# Patient Record
Sex: FEMALE | Race: WHITE | NOT HISPANIC OR LATINO
[De-identification: names, ages, dates, MRNs, and addresses within clinical notes are randomized per-mention and may not be internally consistent; named-entity substitution may affect disease eponyms.]

---

## 2021-12-01 ENCOUNTER — FORM ENCOUNTER (OUTPATIENT)
Age: 51
End: 2021-12-01

## 2021-12-16 ENCOUNTER — APPOINTMENT (OUTPATIENT)
Dept: ORTHOPEDIC SURGERY | Facility: CLINIC | Age: 51
End: 2021-12-16
Payer: COMMERCIAL

## 2021-12-16 ENCOUNTER — NON-APPOINTMENT (OUTPATIENT)
Age: 51
End: 2021-12-16

## 2021-12-16 VITALS
HEIGHT: 66 IN | SYSTOLIC BLOOD PRESSURE: 124 MMHG | WEIGHT: 215 LBS | HEART RATE: 80 BPM | BODY MASS INDEX: 34.55 KG/M2 | OXYGEN SATURATION: 90 % | DIASTOLIC BLOOD PRESSURE: 86 MMHG

## 2021-12-16 DIAGNOSIS — Z78.9 OTHER SPECIFIED HEALTH STATUS: ICD-10-CM

## 2021-12-16 DIAGNOSIS — M17.10 UNILATERAL PRIMARY OSTEOARTHRITIS, UNSPECIFIED KNEE: ICD-10-CM

## 2021-12-16 PROCEDURE — 99214 OFFICE O/P EST MOD 30 MIN: CPT

## 2021-12-17 NOTE — DISCUSSION/SUMMARY
[All Questions Answered] : Patient (and family) had all questions answered to an agreeable level of satisfaction [Interested in Proceeding] : Patient (and family) expressed understanding and interest in proceeding with the plan as outlined [de-identified] : It seems like this is an active chondroid lesion.  I am trying to get old imaging.  Based on the old MRI scan it does not appear that it is different however she says she has had x-rays for the past 13 years since her original injury.  I would like to be able to see this to be able to see the extent to which this has developed her ingrown.  In any event I do not think she needs to wait on her knee replacement she can have this at any point.  If this is an active cartilage lesion it would require either resection or curettaging.  She did have minimal change and I would just watch it.  She is going to get all of her old x-ray imaging for me to follow-up.\par \par If imaging was ordered, the patient was told to make an appointment to review findings right after all imaging is completed.\par \par We discussed risks, benefits and alternatives. Rationale of care was reviewed and all questions were answered. Patient (and family) had all questions answered to her degree of the level of satisfaction. Patient (and family) expressed understanding and interest in proceeding with the plan as outlined.\par \par \par \par \par This note was done with a voice recognition transcription software and any typos are related to this rather than medical error. Surgical risks reviewed. Patient (and family) had all questions answered to an agreeable level of satisfaction. Patient (and family) expressed understanding and interest in proceeding with the plan as outlined.  \par

## 2021-12-17 NOTE — CONSULT LETTER
[Dear  ___] : Dear  [unfilled], [FreeTextEntry2] : Mike Mendoza MD\par [address] [facility], \par [address]\[place] [address] [FreeTextEntry1] : \par After evaluating your patient and reviewing the studies presented we have come to a mutually agreeable plan. \par \par Please see my note below.\par \par Should you have further questions, please feel free to call and discuss the care of your patient.\par \par Thank you for the confidence of your referral.\par \par Sincerely, \par \par David Zhang MD \par Chief, Musculoskeletal Oncology \par [address] [facility], [address], \[place] [address] \par [id number]\par [phone]

## 2021-12-17 NOTE — DATA REVIEWED
[Imaging Present] : Present [de-identified] : CT scan done on December 2, 2021 Shows a 4 to 5 cm lesion in the proximal fibula with internal mineralization consistent with a chondroid lesion.  This has expanded the cortex and has made a thin.  There is no cortical breakthrough or anything outside of the bone.  There are also significant degenerative arthrosis especially in the patellofemoral joint.\par \par MRI scan from December 3, 2020 similarly shows the same lesion.  Between the two modalities it is roughly the same size.  I do not have any x-rays.

## 2021-12-17 NOTE — PHYSICAL EXAM
[General Appearance - Well-Appearing] : Well appearing [General Appearance - Well Nourished] : well nourished [Sclera] : the sclera and conjunctiva were normal [Neck Cervical Mass (___cm)] : no neck mass was observed [Heart Rate And Rhythm] : heart rate was normal and rhythm regular [] : No respiratory distress [Shuffling] : shuffling [Antalgic Gait Right] : antalgic on the right [Antalgic Gait Left] : antalgic on the left [Cane] : Uses cane for ambulation [Tenderness] : tenderness [Swelling] : swelling [Incision Healed - Describe:] : Incision is healed ~M [Full ROM Unless otherwise noted:] : Full range of motion unless otherwise noted: [LE  Motor Strength Normal unless otherwise noted:] : 5/5 strength in bilateral lower extemities unless otherwise noted. [Normal] : Sensation intact to light touch. [2+] : left 2+ [FreeTextEntry1] : Anxious

## 2021-12-17 NOTE — REVIEW OF SYSTEMS
[Joint Pain] : joint pain [Joint Stiffness] : joint stiffness [Joint Swelling] : joint swelling [Anxiety] : anxiety [Feeling Tired] : not feeling tired

## 2021-12-17 NOTE — ADDENDUM
[FreeTextEntry1] : I spoke to Dr. Mendoza on the phone about this so he is aware that arthroplasty can proceed.

## 2021-12-23 DIAGNOSIS — M89.9 DISORDER OF BONE, UNSPECIFIED: ICD-10-CM

## 2022-04-06 ENCOUNTER — APPOINTMENT (OUTPATIENT)
Dept: ORTHOPEDIC SURGERY | Facility: CLINIC | Age: 52
End: 2022-04-06
Payer: OTHER MISCELLANEOUS

## 2022-04-06 VITALS — HEIGHT: 66 IN | BODY MASS INDEX: 34.55 KG/M2 | WEIGHT: 215 LBS

## 2022-04-06 PROCEDURE — 99214 OFFICE O/P EST MOD 30 MIN: CPT

## 2022-04-06 PROCEDURE — 99072 ADDL SUPL MATRL&STAF TM PHE: CPT

## 2022-04-06 NOTE — HISTORY OF PRESENT ILLNESS
[10] : 10 [de-identified] : WC DOI:10/31/2008\par Reports immediate neck pain and pain that radiated down the RUE\par Cspine 4v- straight, DDD most notable C5-7, facet arthropathy\par \par MRI C spine 1/6/22 [facility]: 1. Multilevel degenerative disc disease and spondylosis, mild to moderate in severity at C5-6 and C6-7.\par 2. Mild upper thoracic levoscoliosis with minimal dextroconvex cervical spinal curvature. Straightening of cervical lordosis with grade 1 anterolisthesis of C7 on T1.\par 3. Left eccentric disc bulge at C3-4 resulting in moderate left lateral recess and foraminal stenosis.\par 4. Left eccentric disc bulge at C4-5 with small broad-based right central herniation resulting in mild left foraminal stenosis.\par 5. Bulging disc and broad-based central herniation at C5-6 mildly impinging upon the ventral spinal cord and resulting in mild central canal and moderate to marked bilateral foraminal stenosis.\par 6. Bulging disc at C6-7 resulting in moderate right and moderate to marked left foraminal stenosis.\par 7. Pseudodisc bulge at C7-T1 with bilateral facet joint hypertrophy resulting in moderate to marked left and moderate right foraminal stenosis.\par 8. Broad-based central disc herniations partially visualized at T2-3 and T3-4 with minimal impingement upon the ventral spinal cord. \par Ind. review- C5/6 bulge with HNP and b/l foraminal narrowing; \par C6/7 bulge with b/l foraminal narrowing\par \par Ms. Tami Mg, a 51-year-old female, presents today for neck pain. She fell off a 12 foot ladder 10/31/2008. Pain is right in the back of the neck. Radiates down RUE with numbness into all fingers. Symptoms are worse with activity, wakes up at night with numbness. She is OOW, disabled. She was working as a  on the floor at [employer] at the time of injury. \par 1/10/22- MRI f/u. Still neck pain with pain radiating down the BUE through scapulae, arms, forearms; a/w headaches as well\par 2/23/22- continued neck pain radiating down BUE through scapulae\par 4/6/22-Having severe lower back pain;states her back went out when putting her pants on. PT hs been helping with neck pain. Had an episode of R hand tremors with associated numbness in entire R hand and R anterior forearm a few days ago.

## 2022-04-06 NOTE — PHYSICAL EXAM
[FreeTextEntry8] : Palpation of the cervical spine is as follows: right trapezial tenderness, right rhomboid tenderness and bilateral paracervical tenderness. Range of motion of the cervical spine is as follows: full range of motion with mild stiffness Neurological testing for the cervical spine is as follows: positive left Spurling test, positive Hernández reflex on left and positive Hernández reflex on right.  [FreeTextEntry9] : The injury is a sprain/strain. The injury is a disc herniation. The incident described by the patient was the\par competent medical cause of the injury. The patient's complaints are consistent with the injury. The patient's\par history of the injury is consistent with my objective findings. The percentage of temporary impairment is\par total. Prognosis for recovery is guarded The patient's PMHx reveals pre-existing condition(s) that may affect\par treatment/prognosis. Cervical DDD. The patient cannot return to work because. pain, mobility limitations. No\par Rx restrictions. Board authorized health care provider. I provided the services listed above. \par FORMAL REQUEST AUTHORIZATION FOR Spine Physical Therapy.

## 2022-04-06 NOTE — ASSESSMENT
[FreeTextEntry1] : Given the time that has elapsed between injury and this evaluation, difficult to assess causal relationship between\par injury and current symptoms\par Given that she never had neck pain with pain radiating down the RUE prior to the fall, my opinion is that the injury is\par causally related to her complaints\par PT\par C5/6, C6/7 bulging, HNP with foraminal narrowing discussed ACDF\par

## 2022-04-11 ENCOUNTER — APPOINTMENT (OUTPATIENT)
Dept: ORTHOPEDIC SURGERY | Facility: CLINIC | Age: 52
End: 2022-04-11
Payer: OTHER MISCELLANEOUS

## 2022-04-11 VITALS — HEIGHT: 66 IN | BODY MASS INDEX: 34.55 KG/M2 | WEIGHT: 215 LBS

## 2022-04-11 PROCEDURE — 99213 OFFICE O/P EST LOW 20 MIN: CPT

## 2022-04-11 PROCEDURE — 99072 ADDL SUPL MATRL&STAF TM PHE: CPT

## 2022-04-11 NOTE — HISTORY OF PRESENT ILLNESS
[Gradual] : gradual [9] : 9 [Dull/Aching] : dull/aching [Throbbing] : throbbing [Constant] : constant [Sitting] : sitting [Standing] : standing [Walking] : walking [Exercising] : exercising [Stairs] : stairs [Disabled] : Work status: disabled [de-identified] : 4/11/22: Continued and worsening pain in b/l knees. Has not been able to attend PT due to authorization issues. Prev inj provided mild relief. \par \par Previous doc:\par ( DOI 10/31/2008)\par Pt seeing  Pabon for years for her génesis knee injury at work - she is on disability now- she had a Rt TKA in 2016 - she is having pain with this has cont swelling sn has difficulty bending her knee - pain standing and kneeling- always has swelling in the knee- her left knee has pain with OA has had inj and cont to have pain - new rt knee clicking as well - was never sig improved after surgery -- stairs are very difficulty =she feels overall the rt knee is worse right\par 4/16/19: F/U MRI left knee. Had blood work done. Both knees still hurt.\par 6/10/19: No change - cont pain in both knees. No auth yet for HA inj left knee or brace right knee.\par 11/24/20: Progressively worsening pain of both knees. Previously prescribed mobic did not give her relief. \par 12/18/20: F/U MRI both knees. Cortisone inj left knee helped for 3 days.\par 1/26/21: Orthovisc #1 left knee.\par 2/2/21: Orthovisc #2 left knee.\par 2/9/21: Orthovisc #3 left knee.\par 2/22/21: orthovisc #4 left knee.\par 11/23/21: f/up for bilateral knees. she was seen by Dr. Ryan on 10/11/21 as she was having increasing pain. She was given a CSI for the L knee which was beneficial for 2 days. She is complaining of increasing pain and loss of ROM in the left knee. She is also having medial right knee pain. She has not yet been approved for PT.\par 12/13/21: F/u review CT results.\par 1/21/22: continued and worsening pain in the left knee. She was recently told she needs spinal surgery and would like to continue with PT for now. [] : no [FreeTextEntry1] : B/L KNEE [FreeTextEntry5] : B/L KNEE PAIN IS GETTING WORSE. \par LEFT KNEE IS CRACKING OFTEN [de-identified] : NONE

## 2022-04-11 NOTE — PHYSICAL EXAM
[Left] : left knee [Right] : right knee [FreeTextEntry3] : pain with ROM med and lateral pain - ROM 3- 100 - gaurding

## 2022-04-11 NOTE — ASSESSMENT
[FreeTextEntry1] : Rt Total knee- painful ? mild mid flex instability otherwise looks good - we will get blood work and try a brace to see if that helps - her left knee only has mild OA so I do not see benefit for TKA at this point - for now we will focus on rt as that is the more painful knee\par 4/16/19: MRI showing lateral and PF OA, no meniscus tear seen. Blood work neg. She is not a candidate for arthroscopy and not enough damage yet for left TKA. Long time discussion regarding outcomes of revision for right knee but for now will just try orthovisc left knee.\par 6/10/19: Auth pending for HA inj left knee and HKB right knee. For now will try PT and Mobic.\par 11/24/20: Continued pain the right knee at the proximal aspect of patella and quad insertion, feels that she has small defect there. Will get MRI of the right knee to look at quad tendon and patella component. Left knee has moderate OA that has progressively gotten worse. Will try injection today. Will also repeat MRI of the left knee to look for progression of her previous injury and to evaluate fibular head lesion to confirm no change.\par 12/18/20: Short term relief from left knee inj - MRI with sig PF OA, recc HA injections. 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 Start PT for both knees.\par 11/23/21: R TKA hardware in place; she continues to have R knee pain. Increased left knee pain and loss of ROM. Discussed pain medication vs. L TKA. She has done CSI and HA injections. Most recent CSI 10/11/21 provided minimal relief. Advised her pain may be idiopathic. Patient would like to proceed with L TKA. 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 Planning to proceed with L TKA. Will send to oncologist to confirm nothing needs to be done for fibular head at time of TKA. waiting for Auth for TKA\par 1/21/22: Evaluation of the lesion is begin as per the oncologist. She is planning for TKA after her family issues resolve. She also is being treated for her cspine. Treated with CSI today to alleviate symptoms in the mean time\par \par 4/11/22: overall right TKA still has significant pain and complex postop course due to lack of PT. Will continue to monitor this.  She has significant OA left knee and is preparing for TKA once she feels she is able to and will continue PT on b/l knees. - Discussed not a candidate for Rev TKA at this time with unknow cause of pain

## 2022-06-01 ENCOUNTER — APPOINTMENT (OUTPATIENT)
Dept: ORTHOPEDIC SURGERY | Facility: CLINIC | Age: 52
End: 2022-06-01
Payer: OTHER MISCELLANEOUS

## 2022-06-01 VITALS — BODY MASS INDEX: 31.98 KG/M2 | WEIGHT: 199 LBS | HEIGHT: 66 IN

## 2022-06-01 PROCEDURE — 99214 OFFICE O/P EST MOD 30 MIN: CPT

## 2022-06-01 PROCEDURE — 99072 ADDL SUPL MATRL&STAF TM PHE: CPT

## 2022-06-01 RX ORDER — NAPROXEN 500 MG/1
500 TABLET ORAL
Qty: 60 | Refills: 0 | Status: ACTIVE | COMMUNITY
Start: 2022-04-06 | End: 1900-01-01

## 2022-06-13 ENCOUNTER — APPOINTMENT (OUTPATIENT)
Dept: ORTHOPEDIC SURGERY | Facility: CLINIC | Age: 52
End: 2022-06-13
Payer: OTHER MISCELLANEOUS

## 2022-06-13 VITALS — BODY MASS INDEX: 31.98 KG/M2 | WEIGHT: 199 LBS | HEIGHT: 66 IN

## 2022-06-13 DIAGNOSIS — E78.00 PURE HYPERCHOLESTEROLEMIA, UNSPECIFIED: ICD-10-CM

## 2022-06-13 PROCEDURE — 99072 ADDL SUPL MATRL&STAF TM PHE: CPT

## 2022-06-13 PROCEDURE — 99213 OFFICE O/P EST LOW 20 MIN: CPT

## 2022-06-13 NOTE — ASSESSMENT
[FreeTextEntry1] : Rt Total knee- painful ? mild mid flex instability otherwise looks good - we will get blood work and try a brace to see if that helps - her left knee only has mild OA so I do not see benefit for TKA at this point - for now we will focus on rt as that is the more painful knee\par 4/16/19: MRI showing lateral and PF OA, no meniscus tear seen. Blood work neg. She is not a candidate for arthroscopy and not enough damage yet for left TKA. Long time discussion regarding outcomes of revision for right knee but for now will just try orthovisc left knee.\par 6/10/19: Auth pending for HA inj left knee and HKB right knee. For now will try PT and Mobic.\par 11/24/20: Continued pain the right knee at the proximal aspect of patella and quad insertion, feels that she has small defect there. Will get MRI of the right knee to look at quad tendon and patella component. Left knee has moderate OA that has progressively gotten worse. Will try injection today. Will also repeat MRI of the left knee to look for progression of her previous injury and to evaluate fibular head lesion to confirm no change.\par 12/18/20: Short term relief from left knee inj - MRI with sig PF OA, recc HA injections. 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 Start PT for both knees.\par 11/23/21: R TKA hardware in place; she continues to have R knee pain. Increased left knee pain and loss of ROM. Discussed pain medication vs. L TKA. She has done CSI and HA injections. Most recent CSI 10/11/21 provided minimal relief. Advised her pain may be idiopathic. Patient would like to proceed with L TKA. 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 Planning to proceed with L TKA. Will send to oncologist to confirm nothing needs to be done for fibular head at time of TKA. waiting for Auth for TKA\par 1/21/22: Evaluation of the lesion is begin as per the oncologist. She is planning for TKA after her family issues resolve. She also is being treated for her cspine. Treated with CSI today to alleviate symptoms in the mean time\par 4/11/22: overall right TKA still has significant pain and complex postop course due to lack of PT. Will continue to monitor this.  She has significant OA left knee and is preparing for TKA once she feels she is able to and will continue PT on b/l knees. - Discussed not a candidate for Rev TKA at this time with unknow cause of pain  \par \par 6/13/22: b/l knee still continued pain, cont PT for both knee building strength plan for L TKA benji assisted waiting for auth- I am conc right knee still gives her pain w/o obbvi reason. Continue to work this up for underlying cause. CT scan showed bone cyst was told it was benign.

## 2022-06-13 NOTE — HISTORY OF PRESENT ILLNESS
[Work related] : work related [Gradual] : gradual [Result of repetitive motion] : result of repetitive motion [9] : 9 [Dull/Aching] : dull/aching [Throbbing] : throbbing [Constant] : constant [Sitting] : sitting [Standing] : standing [Walking] : walking [Exercising] : exercising [Stairs] : stairs [Disabled] : Work status: disabled [de-identified] : 6/13/22: continued and worsening pain in b/l knees- currently, the right is worse than the left. She had delayed PT course due to auth issues\par \par Previous doc:\par ( DOI 10/31/2008)\par Pt seeing Dr Pabon for years for her génesis knee injury at work - she is on disability now- she had a Rt TKA in 2016 - she is having pain with this has cont swelling sn has difficulty bending her knee - pain standing and kneeling- always has swelling in the knee- her left knee has pain with OA has had inj and cont to have pain - new rt knee clicking as well - was never sig improved after surgery -- stairs are very difficulty =she feels overall the rt knee is worse right\par 4/16/19: F/U MRI left knee. Had blood work done. Both knees still hurt.\par 6/10/19: No change - cont pain in both knees. No auth yet for HA inj left knee or brace right knee.\par 11/24/20: Progressively worsening pain of both knees. Previously prescribed mobic did not give her relief. \par 12/18/20: F/U MRI both knees. Cortisone inj left knee helped for 3 days.\par 1/26/21: Orthovisc #1 left knee.\par 2/2/21: Orthovisc #2 left knee.\par 2/9/21: Orthovisc #3 left knee.\par 2/22/21: orthovisc #4 left knee.\par 11/23/21: f/up for bilateral knees. she was seen by Dr. Ryan on 10/11/21 as she was having increasing pain. She was given a CSI for the L knee which was beneficial for 2 days. She is complaining of increasing pain and loss of ROM in the left knee. She is also having medial right knee pain. She has not yet been approved for PT.\par 12/13/21: F/u review CT results.\par 1/21/22: continued and worsening pain in the left knee. She was recently told she needs spinal surgery and would like to continue with PT for now.\par 4/11/22: Continued and worsening pain in b/l knees. Has not been able to attend PT due to authorization issues. Prev inj provided mild relief.  [] : no [FreeTextEntry1] : B/L KNEE [FreeTextEntry3] : 10.31.2008 [FreeTextEntry5] : B/L KNEE PAIN IS GETTING WORSE. \par LEFT KNEE IS CRACKING OFTEN [de-identified] : PT

## 2022-06-13 NOTE — PHYSICAL EXAM
[Left] : left knee [Right] : right knee [FreeTextEntry3] : pain with ROM med and lateral pain - ROM 3- 120 - gaurding

## 2022-06-13 NOTE — DISCUSSION/SUMMARY
[de-identified] : The natural progression of Osteoarthritis was explained to the patient.  We discussed the possible treatment options from conservative to operative.  These included NSAIDS, Glucosamine and Chondrotin sulfate, and Physical Therapy as well different types of injections.  We also discussed that at some point they may progress to needed a TKA.  Information and pamphlets were given when appropriate.\par

## 2022-06-13 NOTE — REASON FOR VISIT
[FreeTextEntry2] : 06/13/2022 Follow up for Bl Knee, doing better with PT, RT > Left knee pain , S/P TKA RT knee 2016\par \par WC DOI: 10/31/2008

## 2022-06-16 NOTE — HISTORY OF PRESENT ILLNESS
[9] : 9 [Not working due to injury] : Work status: not working due to injury [] : yes [FreeTextEntry1] : neck and back  [de-identified] : WC DOI:10/31/2008\par Reports immediate neck pain and pain that radiated down the RUE\par Cspine 4v- straight, DDD most notable C5-7, facet arthropathy\par \par MRI C spine 1/6/22 [facility]: 1. Multilevel degenerative disc disease and spondylosis, mild to moderate in severity at C5-6 and C6-7.\par 2. Mild upper thoracic levoscoliosis with minimal dextroconvex cervical spinal curvature. Straightening of cervical lordosis with grade 1 anterolisthesis of C7 on T1.\par 3. Left eccentric disc bulge at C3-4 resulting in moderate left lateral recess and foraminal stenosis.\par 4. Left eccentric disc bulge at C4-5 with small broad-based right central herniation resulting in mild left foraminal stenosis.\par 5. Bulging disc and broad-based central herniation at C5-6 mildly impinging upon the ventral spinal cord and resulting in mild central canal and moderate to marked bilateral foraminal stenosis.\par 6. Bulging disc at C6-7 resulting in moderate right and moderate to marked left foraminal stenosis.\par 7. Pseudodisc bulge at C7-T1 with bilateral facet joint hypertrophy resulting in moderate to marked left and moderate right foraminal stenosis.\par 8. Broad-based central disc herniations partially visualized at T2-3 and T3-4 with minimal impingement upon the ventral spinal cord. \par Ind. review- \par C4/5 bulge with mild b/l NF narrowing; \par C5/6 bulge with HNP and b/l foraminal narrowing; \par C6/7 bulge with b/l foraminal narrowing;\par C7/T1 subtle anterolisthesis w/o clear NF stenosis\par \par -------------------------------------\par PMH: HLD; PSH: hernia, finger tendon repair, rTKA\par SH: intermittent tobacco, occ. etoh, denies drugs. Not working. \par \par 12/17/21- Ms. Tami Mg, a 51-year-old female, presents today for neck pain. She fell off a 12 foot ladder 10/31/2008. Pain is right in the back of the neck. Radiates down RUE with numbness into all fingers. Symptoms are worse with activity, wakes up at night with numbness. She is OOW, disabled. She was working as a  on the floor at [employer] at the time of injury. \par 1/10/22- MRI f/u. Still neck pain with pain radiating down the BUE through scapulae, arms, forearms; a/w headaches as well\par 2/23/22- continued neck pain radiating down BUE through scapulae\par 4/6/22- Having severe lower back pain; states her back went out when putting her pants on. PT has been helping with neck pain. Had an episode of R hand tremors with associated numbness in entire R hand and R anterior forearm a few days ago.\par 6/1/22- Neck and low back pain is worse since last visit. Gabapentin with mild relief. Numbness in bilateral UEs localized both dorsal and volar forearms, is somewhat worse when sleeping from elbows to all fingers bilaterally.  Physical therapy was denied.

## 2022-06-16 NOTE — REASON FOR VISIT
[FreeTextEntry2] : pt is here for WC follow up of neck and back. pt states pain level is worse than last visit because they cut off her PT

## 2022-06-16 NOTE — ASSESSMENT
[FreeTextEntry1] : Given the time that has elapsed between injury and this evaluation, difficult to assess causal relationship between\par injury and current symptoms. \par Given that she never had neck pain with pain radiating down the RUE prior to the fall, my opinion is that the injury is causally related to her complaints. \par PT has been discontinued, was seeing some benefit\par Reports having undergone EMG this year, will have records sent, suspect Double Crush based on exam. Discussed empiric splinting QHS for CTS while she has records faxed to us. \par Patient has undergone extensive conservative measures including PT, meds for years with persistent upper extremity radiculopathy. \par Discussed continued conservative measures including PT, meds, Delmi, as well as operative interventions. \par Patient interested in more definitive interventions. \par \par MRI shows:\par C4/5 bulge with mild b/l NF narrowing; \par C5/6 bulge with HNP and b/l foraminal narrowing; \par C6/7 bulge with b/l foraminal narrowing;\par C7/T1 subtle anterolisthesis w/o clear NF stenosis \par \par Discussed ACDF C5/6, C6/7\par \par Anterior Cervical Discectomy and Fusion- We've discussed the surgery details including instrumentation and grafting options (local, allograft, ICBG, and biologics) as well as potential for complications including but not limited to pain, scar, bleeding, and infection. There is also a possibility for hardware complication such as malposition of hardware, hardware loosening, pullout, failure or fracture of bone, adjacent segment disease, pseudarthrosis, and need for future surgery. Finally, we discussed potential for injury to nerves, the spinal cord either transient or permanent, CSF leak, damage to blood vessels, paralysis, blindness, stroke, dysphagia, dysphonia, need for transfusion, and medical complications.  The patient verbalized understanding and all questions were answered. \par

## 2022-06-16 NOTE — IMAGING
[de-identified] : CSPINE\par Inspection: No rash or ecchymosis\par Palpation: No spasm in traps, rhomboids, paracervicals; TTP R trap and rhomboid\par ROM: Full with stiffness\par Strength: 5/5 bilateral deltoid, biceps, triceps, wrist flexors, wrist extensors, , abductors\par Sensation: Sensation present to light touch bilateral C5-T1 distributions\par Neurological testing for the cervical spine is as follows: positive left Spurling test, positive Hernández reflex on left and positive Hernández reflex on right. \par \par +b/l carpal compression testing. -Tinels b/l cubital tunnels and neg hyperflexion tests at elbows\par \par  The injury is a sprain/strain. The injury is a disc herniation, radiculopathy. The incident described by the patient was the\par competent medical cause of the injury. The patient's complaints are consistent with the injury. The patient's\par history of the injury is consistent with my objective findings. The percentage of temporary impairment is\par total. Prognosis for recovery is guarded The patient's PMHx reveals pre-existing condition(s) that may affect\par treatment/prognosis. Cervical DDD, peripheral nerve compression. The patient cannot return to work because. pain, mobility limitations. No Rx restrictions. Board authorized health care provider. I provided the services listed above. \par \par FORMAL REQUEST AUTHORIZATION FOR Spine surgery

## 2022-07-11 ENCOUNTER — APPOINTMENT (OUTPATIENT)
Dept: ORTHOPEDIC SURGERY | Facility: CLINIC | Age: 52
End: 2022-07-11

## 2022-07-11 VITALS — WEIGHT: 198 LBS | HEIGHT: 67 IN | BODY MASS INDEX: 31.08 KG/M2

## 2022-07-11 PROCEDURE — 99213 OFFICE O/P EST LOW 20 MIN: CPT

## 2022-07-11 PROCEDURE — 99072 ADDL SUPL MATRL&STAF TM PHE: CPT

## 2022-07-11 NOTE — DISCUSSION/SUMMARY
[de-identified] : The patient was advised of the diagnosis.  The natural history of the pathology was explained in full to the patient in layman's terms. All questions were answered.  The risks and benefits of surgical and non-surgical treatment alternatives were explained in full to the patient.\par

## 2022-07-11 NOTE — HISTORY OF PRESENT ILLNESS
[Work related] : work related [10] : 10 [Not working due to injury] : Work status: not working due to injury [8] : 8 [] : yes [de-identified] : 7/11/22: No change, since last visit.  Has not had any PT auth yet.  States there were no records received by  at her last hearing.\par \par Previous doc:\par ( DOI 10/31/2008)\par Pt seeing Dr Pabon for years for her génesis knee injury at work - she is on disability now- she had a Rt TKA in 2016 - she is having pain with this has cont swelling sn has difficulty bending her knee - pain standing and kneeling- always has swelling in the knee- her left knee has pain with OA has had inj and cont to have pain - new rt knee clicking as well - was never sig improved after surgery -- stairs are very difficulty =she feels overall the rt knee is worse right\par 4/16/19: F/U MRI left knee. Had blood work done. Both knees still hurt.\par 6/10/19: No change - cont pain in both knees. No auth yet for HA inj left knee or brace right knee.\par 11/24/20: Progressively worsening pain of both knees. Previously prescribed mobic did not give her relief. \par 12/18/20: F/U MRI both knees. Cortisone inj left knee helped for 3 days.\par 1/26/21: Orthovisc #1 left knee.\par 2/2/21: Orthovisc #2 left knee.\par 2/9/21: Orthovisc #3 left knee.\par 2/22/21: orthovisc #4 left knee.\par 11/23/21: f/up for bilateral knees. she was seen by Dr. Ryan on 10/11/21 as she was having increasing pain. She was given a CSI for the L knee which was beneficial for 2 days. She is complaining of increasing pain and loss of ROM in the left knee. She is also having medial right knee pain. She has not yet been approved for PT.\par 12/13/21: F/u review CT results.\par 1/21/22: continued and worsening pain in the left knee. She was recently told she needs spinal surgery and would like to continue with PT for now.\par 4/11/22: Continued and worsening pain in b/l knees. Has not been able to attend PT due to authorization issues. Prev inj provided mild relief. \par 6/13/22: continued and worsening pain in b/l knees- currently, the right is worse than the left. 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

## 2022-07-13 ENCOUNTER — APPOINTMENT (OUTPATIENT)
Dept: ORTHOPEDIC SURGERY | Facility: CLINIC | Age: 52
End: 2022-07-13

## 2022-07-13 VITALS — HEIGHT: 67 IN | BODY MASS INDEX: 31.08 KG/M2 | WEIGHT: 198 LBS

## 2022-07-13 PROCEDURE — 99214 OFFICE O/P EST MOD 30 MIN: CPT

## 2022-07-13 PROCEDURE — 99072 ADDL SUPL MATRL&STAF TM PHE: CPT

## 2022-07-13 NOTE — HISTORY OF PRESENT ILLNESS
[Neck] : neck [Lower back] : lower back [9] : 9 [Constant] : constant [Not working due to injury] : Work status: not working due to injury [de-identified] : WC DOI:10/31/2008\par Reports immediate neck pain and pain that radiated down the RUE\par Cspine 4v- straight, DDD most notable C5-7, facet arthropathy\par \par MRI C spine 1/6/22 [facility]: 1. Multilevel degenerative disc disease and spondylosis, mild to moderate in severity at C5-6 and C6-7.\par 2. Mild upper thoracic levoscoliosis with minimal dextroconvex cervical spinal curvature. Straightening of cervical lordosis with grade 1 anterolisthesis of C7 on T1.\par 3. Left eccentric disc bulge at C3-4 resulting in moderate left lateral recess and foraminal stenosis.\par 4. Left eccentric disc bulge at C4-5 with small broad-based right central herniation resulting in mild left foraminal stenosis.\par 5. Bulging disc and broad-based central herniation at C5-6 mildly impinging upon the ventral spinal cord and resulting in mild central canal and moderate to marked bilateral foraminal stenosis.\par 6. Bulging disc at C6-7 resulting in moderate right and moderate to marked left foraminal stenosis.\par 7. Pseudodisc bulge at C7-T1 with bilateral facet joint hypertrophy resulting in moderate to marked left and moderate right foraminal stenosis.\par 8. Broad-based central disc herniations partially visualized at T2-3 and T3-4 with minimal impingement upon the ventral spinal cord. \par Ind. review- \par C4/5 bulge with mild b/l NF narrowing; \par C5/6 bulge with HNP and b/l foraminal narrowing; \par C6/7 bulge with b/l foraminal narrowing;\par C7/T1 subtle anterolisthesis w/o clear NF stenosis\par \par EMG BUE 1/13/22- Mild L CTS\par \par -------------------------------------\par PMH: HLD; PSH: hernia, finger tendon repair, rTKA\par SH: intermittent tobacco, occ. etoh, denies drugs. Not working. \par \par 12/17/21- Ms. Tami Mg, a 51-year-old female, presents today for neck pain. She fell off a 12 foot ladder 10/31/2008. Pain is right in the back of the neck. Radiates down RUE with numbness into all fingers. Symptoms are worse with activity, wakes up at night with numbness. She is OOW, disabled. She was working as a  on the floor at [employer] at the time of injury. \par 1/10/22- MRI f/u. Still neck pain with pain radiating down the BUE through scapulae, arms, forearms; a/w headaches as well\par 2/23/22- continued neck pain radiating down BUE through scapulae\par 4/6/22- Having severe lower back pain; states her back went out when putting her pants on. PT has been helping with neck pain. Had an episode of R hand tremors with associated numbness in entire R hand and R anterior forearm a few days ago.\par 6/1/22- Neck and low back pain is worse since last visit. Gabapentin with mild relief. Numbness in bilateral UEs localized both dorsal and volar forearms, is somewhat worse when sleeping from elbows to all fingers bilaterally.  Physical therapy was denied. \par 7/13/22- Still neck pain radiating down the BUE through scapulae, arms, dorsal forearms.  [] : Post Surgical Visit: no [FreeTextEntry1] : neck and back

## 2022-07-13 NOTE — IMAGING
[de-identified] : CSPINE\par Inspection: No rash or ecchymosis\par Palpation: No spasm in traps, rhomboids, paracervicals; TTP R trap and rhomboid\par ROM: Full with stiffness\par Strength: 5/5 bilateral deltoid, biceps, triceps, wrist flexors, wrist extensors, , abductors\par Sensation: Sensation present to light touch bilateral C5-T1 distributions\par Neurological testing for the cervical spine is as follows: positive left Spurling test, positive Hernández reflex on left and positive Hernández reflex on right. \par \par +b/l carpal compression testing. -Tinels b/l cubital tunnels and neg hyperflexion tests at elbows\par \par  The injury is a sprain/strain. The injury is a disc herniation, radiculopathy. The incident described by the patient was the\par competent medical cause of the injury. The patient's complaints are consistent with the injury. The patient's\par history of the injury is consistent with my objective findings. The percentage of temporary impairment is\par total. Prognosis for recovery is guarded The patient's PMHx reveals pre-existing condition(s) that may affect\par treatment/prognosis. Cervical DDD, peripheral nerve compression. The patient cannot return to work because. pain, mobility limitations. No Rx restrictions. Board authorized health care provider. I provided the services listed above. \par \par FORMAL REQUEST AUTHORIZATION FOR Spine surgery

## 2022-07-13 NOTE — ASSESSMENT
[FreeTextEntry1] : Given the time that has elapsed between injury and this evaluation, difficult to assess causal relationship between\par injury and current symptoms. \par Given that she never had neck pain with pain radiating down the BUE prior to the fall, my opinion is that the injury is causally related to her complaints. \par PT has been discontinued, was seeing some benefit\par Carpal tunnel splints QHS. \par Patient has undergone extensive conservative measures including PT, meds for years with persistent upper extremity radiculopathy. \par Discussed continued conservative measures including PT, meds, Delmi, as well as operative interventions. \par Patient interested in more definitive interventions. \par \par MRI shows:\par C4/5 bulge with mild b/l NF narrowing; \par C5/6 bulge with HNP and b/l foraminal narrowing; \par C6/7 bulge with b/l foraminal narrowing;\par C7/T1 subtle anterolisthesis w/o clear NF stenosis \par \par Indicating for ACDF C5/6, C6/7\par \par Anterior Cervical Discectomy and Fusion- We've discussed the surgery details including instrumentation and grafting options (local, allograft, ICBG, and biologics) as well as potential for complications including but not limited to pain, scar, bleeding, and infection. There is also a possibility for hardware complication such as malposition of hardware, hardware loosening, pullout, failure or fracture of bone, adjacent segment disease, pseudarthrosis, and need for future surgery. Finally, we discussed potential for injury to nerves, the spinal cord either transient or permanent, CSF leak, damage to blood vessels, paralysis, blindness, stroke, dysphagia, dysphonia, need for transfusion, and medical complications.  The patient verbalized understanding and all questions were answered. \par

## 2022-08-22 ENCOUNTER — APPOINTMENT (OUTPATIENT)
Dept: ORTHOPEDIC SURGERY | Facility: CLINIC | Age: 52
End: 2022-08-22

## 2022-08-22 VITALS — BODY MASS INDEX: 31.08 KG/M2 | HEIGHT: 67 IN | WEIGHT: 198 LBS

## 2022-08-22 PROCEDURE — 99213 OFFICE O/P EST LOW 20 MIN: CPT

## 2022-08-22 PROCEDURE — 99072 ADDL SUPL MATRL&STAF TM PHE: CPT

## 2022-08-22 NOTE — DISCUSSION/SUMMARY
[de-identified] : The patient was advised of the diagnosis.  The natural history of the pathology was explained in full to the patient in layman's terms. All questions were answered.  The risks and benefits of surgical and non-surgical treatment alternatives were explained in full to the patient.\par

## 2022-08-22 NOTE — ASSESSMENT
[FreeTextEntry1] : Rt Total knee- painful ? mild mid flex instability otherwise looks good - we will get blood work and try a brace to see if that helps - her left knee only has mild OA so I do not see benefit for TKA at this point - for now we will focus on rt as that is the more painful knee\par 4/16/19: MRI showing lateral and PF OA, no meniscus tear seen. Blood work neg. She is not a candidate for arthroscopy and not enough damage yet for left TKA. Long time discussion regarding outcomes of revision for right knee but for now will just try orthovisc left knee.\par 6/10/19: Auth pending for HA inj left knee and HKB right knee. For now will try PT and Mobic.\par 11/24/20: Continued pain the right knee at the proximal aspect of patella and quad insertion, feels that she has small defect there. Will get MRI of the right knee to look at quad tendon and patella component. Left knee has moderate OA that has progressively gotten worse. Will try injection today. Will also repeat MRI of the left knee to look for progression of her previous injury and to evaluate fibular head lesion to confirm no change.\par 12/18/20: Short term relief from left knee inj - MRI with sig PF OA, recc HA injections. 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 Start PT for both knees.\par 11/23/21: R TKA hardware in place; she continues to have R knee pain. Increased left knee pain and loss of ROM. Discussed pain medication vs. L TKA. She has done CSI and HA injections. Most recent CSI 10/11/21 provided minimal relief. Advised her pain may be idiopathic. Patient would like to proceed with L TKA. 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 Planning to proceed with L TKA. Will send to oncologist to confirm nothing needs to be done for fibular head at time of TKA. waiting for Auth for TKA\par 1/21/22: Evaluation of the lesion is begin as per the oncologist. She is planning for TKA after her family issues resolve. She also is being treated for her cspine. Treated with CSI today to alleviate symptoms in the mean time\par 4/11/22: overall right TKA still has significant pain and complex postop course due to lack of PT. Will continue to monitor this.  She has significant OA left knee and is preparing for TKA once she feels she is able to and will continue PT on b/l knees. - Discussed not a candidate for Rev TKA at this time with unknow cause of pain  \par 6/13/22: b/l knee still continued pain, cont PT for both knee building strength plan for L TKA benji assisted waiting for auth- I am conc right knee still gives her pain w/o obbvi reason. Continue to work this up for underlying cause. CT scan showed bone cyst was told it was benign. \par 7/11/22: No change - still with pain in both knees.  Again recc PT to work on strengthening and reeval after 4-6 weeks of treatment.\par \par 8/22/22: Starting PT this week for both knees and will then reeval.  Still painful right TKA if no improvement will get further workup while we await auth for left TKA.

## 2022-08-22 NOTE — HISTORY OF PRESENT ILLNESS
[9] : 9 [8] : 8 [de-identified] : 8/22/22: No change since last visit - just got auth for PT and is scheduled to start this week.\par \par Previous doc:\par ( DOI 10/31/2008)\par Pt seeing Dr Pabon for years for her génesis knee injury at work - she is on disability now- she had a Rt TKA in 2016 - she is having pain with this has cont swelling sn has difficulty bending her knee - pain standing and kneeling- always has swelling in the knee- her left knee has pain with OA has had inj and cont to have pain - new rt knee clicking as well - was never sig improved after surgery -- stairs are very difficulty =she feels overall the rt knee is worse right\par 4/16/19: F/U MRI left knee. Had blood work done. Both knees still hurt.\par 6/10/19: No change - cont pain in both knees. No auth yet for HA inj left knee or brace right knee.\par 11/24/20: Progressively worsening pain of both knees. Previously prescribed mobic did not give her relief. \par 12/18/20: F/U MRI both knees. Cortisone inj left knee helped for 3 days.\par 1/26/21: Orthovisc #1 left knee.\par 2/2/21: Orthovisc #2 left knee.\par 2/9/21: Orthovisc #3 left knee.\par 2/22/21: orthovisc #4 left knee.\par 11/23/21: f/up for bilateral knees. she was seen by Dr. Ryan on 10/11/21 as she was having increasing pain. She was given a CSI for the L knee which was beneficial for 2 days. She is complaining of increasing pain and loss of ROM in the left knee. She is also having medial right knee pain. She has not yet been approved for PT.\par 12/13/21: F/u review CT results.\par 1/21/22: continued and worsening pain in the left knee. She was recently told she needs spinal surgery and would like to continue with PT for now.\par 4/11/22: Continued and worsening pain in b/l knees. Has not been able to attend PT due to authorization issues. Prev inj provided mild relief. \par 6/13/22: continued and worsening pain in b/l knees- currently, the right is worse than the left. She had delayed PT course due to auth issues\par 7/11/22: No change, since last visit.  Has not had any PT auth yet.  States there were no records received by  at her last hearing. [FreeTextEntry1] : Korey. Knees [FreeTextEntry5] :  Follow up - Korey. knees DOI- 10/31/2008 [de-identified] : motion

## 2022-09-02 ENCOUNTER — APPOINTMENT (OUTPATIENT)
Dept: ORTHOPEDIC SURGERY | Facility: CLINIC | Age: 52
End: 2022-09-02

## 2022-09-08 ENCOUNTER — APPOINTMENT (OUTPATIENT)
Dept: ORTHOPEDIC SURGERY | Facility: HOSPITAL | Age: 52
End: 2022-09-08

## 2022-09-14 ENCOUNTER — FORM ENCOUNTER (OUTPATIENT)
Age: 52
End: 2022-09-14

## 2022-09-26 ENCOUNTER — APPOINTMENT (OUTPATIENT)
Dept: ORTHOPEDIC SURGERY | Facility: CLINIC | Age: 52
End: 2022-09-26

## 2022-09-26 VITALS — WEIGHT: 193 LBS | HEIGHT: 66 IN | BODY MASS INDEX: 31.02 KG/M2

## 2022-09-26 PROCEDURE — 99072 ADDL SUPL MATRL&STAF TM PHE: CPT

## 2022-09-26 PROCEDURE — J3490M: CUSTOM

## 2022-09-26 PROCEDURE — 20611 DRAIN/INJ JOINT/BURSA W/US: CPT

## 2022-09-26 PROCEDURE — 99214 OFFICE O/P EST MOD 30 MIN: CPT | Mod: 25

## 2022-09-26 NOTE — ASSESSMENT
[FreeTextEntry1] : Rt Total knee- painful ? mild mid flex instability otherwise looks good - we will get blood work and try a brace to see if that helps - her left knee only has mild OA so I do not see benefit for TKA at this point - for now we will focus on rt as that is the more painful knee\par 4/16/19: MRI showing lateral and PF OA, no meniscus tear seen. Blood work neg. She is not a candidate for arthroscopy and not enough damage yet for left TKA. Long time discussion regarding outcomes of revision for right knee but for now will just try orthovisc left knee.\par 6/10/19: Auth pending for HA inj left knee and HKB right knee. For now will try PT and Mobic.\par 11/24/20: Continued pain the right knee at the proximal aspect of patella and quad insertion, feels that she has small defect there. Will get MRI of the right knee to look at quad tendon and patella component. Left knee has moderate OA that has progressively gotten worse. Will try injection today. Will also repeat MRI of the left knee to look for progression of her previous injury and to evaluate fibular head lesion to confirm no change.\par 12/18/20: Short term relief from left knee inj - MRI with sig PF OA, recc HA injections. 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 Start PT for both knees.\par 11/23/21: R TKA hardware in place; she continues to have R knee pain. Increased left knee pain and loss of ROM. Discussed pain medication vs. L TKA. She has done CSI and HA injections. Most recent CSI 10/11/21 provided minimal relief. Advised her pain may be idiopathic. Patient would like to proceed with L TKA. 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 Planning to proceed with L TKA. Will send to oncologist to confirm nothing needs to be done for fibular head at time of TKA. waiting for Auth for TKA\par 1/21/22: Evaluation of the lesion is begin as per the oncologist. She is planning for TKA after her family issues resolve. She also is being treated for her cspine. Treated with CSI today to alleviate symptoms in the mean time\par 4/11/22: overall right TKA still has significant pain and complex postop course due to lack of PT. Will continue to monitor this.  She has significant OA left knee and is preparing for TKA once she feels she is able to and will continue PT on b/l knees. - Discussed not a candidate for Rev TKA at this time with unknow cause of pain  \par 6/13/22: b/l knee still continued pain, cont PT for both knee building strength plan for L TKA benji assisted waiting for auth- I am conc right knee still gives her pain w/o obbvi reason. Continue to work this up for underlying cause. CT scan showed bone cyst was told it was benign. \par 7/11/22: No change - still with pain in both knees.  Again recc PT to work on strengthening and reeval after 4-6 weeks of treatment.\par 8/22/22: Starting PT this week for both knees and will then reeval.  Still painful right TKA if no improvement will get further workup while we await auth for left TKA.\par \par 9/26/22: Left knee inj today for acute relief, cont PT and reeval in 6 weeks.  I still feel we should proceed with TKA but will try some more conservative treatments for now while we wait for auth.

## 2022-09-26 NOTE — DISCUSSION/SUMMARY
[de-identified] : The patient was advised of the diagnosis.  The natural history of the pathology was explained in full to the patient in layman's terms. All questions were answered.  The risks and benefits of surgical and non-surgical treatment alternatives were explained in full to the patient.\par

## 2022-09-26 NOTE — HISTORY OF PRESENT ILLNESS
[Work related] : work related [10] : 10 [9] : 9 [Dull/Aching] : dull/aching [] : yes [de-identified] : 9/26/22: Issue with auth for left TKA surgery last month was cancelled.  Worsening pain, currently in PT.\par \par Previous doc:\par (WC DOI 10/31/2008)\par Pt seeing Dr Pabon for years for her génesis knee injury at work - she is on disability now- she had a Rt TKA in 2016 - she is having pain with this has cont swelling sn has difficulty bending her knee - pain standing and kneeling- always has swelling in the knee- her left knee has pain with OA has had inj and cont to have pain - new rt knee clicking as well - was never sig improved after surgery -- stairs are very difficulty =she feels overall the rt knee is worse right\par 4/16/19: F/U MRI left knee. Had blood work done. Both knees still hurt.\par 6/10/19: No change - cont pain in both knees. No auth yet for HA inj left knee or brace right knee.\par 11/24/20: Progressively worsening pain of both knees. Previously prescribed mobic did not give her relief. \par 12/18/20: F/U MRI both knees. Cortisone inj left knee helped for 3 days.\par 1/26/21: Orthovisc #1 left knee.\par 2/2/21: Orthovisc #2 left knee.\par 2/9/21: Orthovisc #3 left knee.\par 2/22/21: orthovisc #4 left knee.\par 11/23/21: f/up for bilateral knees. she was seen by Dr. Ryan on 10/11/21 as she was having increasing pain. She was given a CSI for the L knee which was beneficial for 2 days. She is complaining of increasing pain and loss of ROM in the left knee. She is also having medial right knee pain. She has not yet been approved for PT.\par 12/13/21: F/u review CT results.\par 1/21/22: continued and worsening pain in the left knee. She was recently told she needs spinal surgery and would like to continue with PT for now.\par 4/11/22: Continued and worsening pain in b/l knees. Has not been able to attend PT due to authorization issues. Prev inj provided mild relief. \par 6/13/22: continued and worsening pain in b/l knees- currently, the right is worse than the left. She had delayed PT course due to auth issues\par 7/11/22: No change, since last visit.  Has not had any PT auth yet.  States there were no records received by  at her last hearing.\par 8/22/22: No change since last visit - just got auth for PT and is scheduled to start this week. [FreeTextEntry1] : knees [FreeTextEntry3] : 10/31/2008

## 2022-09-26 NOTE — PROCEDURE
[FreeTextEntry3] : Large joint injection was performed on the left knee. The indication for this procedure was pain, inflammation, and x-ray evidence of Osteoarthritis on this or prior visit. The site was prepped with betadine, ethyl chloride sprayed topically, and sterile technique used. An injection of Lidocaine 3cc of 1% , Bupivacaine (Marcaine) 5cc of 0.25% , Methylprednisolone (Depomedrol) cc of 80 mg was used. Patient was advised to call if redness, pain, or fever occur, apply ice for 15 minutes out of every hour for the next 12-24 hours as tolerated and patient was advised to rest the joint(s) for days.\par Patient has tried OTC's including aspirin, Ibuprofen, Aleve, etc or prescription NSAIDS, and/or exercises at home and/or physical therapy without satisfactory response and patient had decreased mobility in the joint. Ultrasound guidance was indicated for this patient due to better visualize joint space. All ultrasound images have been permanently captured and stored accordingly in our picture.\par

## 2022-10-10 ENCOUNTER — APPOINTMENT (OUTPATIENT)
Dept: ORTHOPEDIC SURGERY | Facility: CLINIC | Age: 52
End: 2022-10-10

## 2022-10-10 VITALS — HEIGHT: 66 IN | WEIGHT: 193 LBS | BODY MASS INDEX: 31.02 KG/M2

## 2022-10-10 DIAGNOSIS — Z78.9 OTHER SPECIFIED HEALTH STATUS: ICD-10-CM

## 2022-10-10 PROCEDURE — 99072 ADDL SUPL MATRL&STAF TM PHE: CPT

## 2022-10-10 PROCEDURE — 99213 OFFICE O/P EST LOW 20 MIN: CPT

## 2022-10-10 NOTE — HISTORY OF PRESENT ILLNESS
[Gradual] : gradual [9] : 9 [Dull/Aching] : dull/aching [Sharp] : sharp [Shooting] : shooting [Constant] : constant [Household chores] : household chores [Leisure] : leisure [Work] : work [Sleep] : sleep [Sexual activity] : sexual activity [Social interactions] : social interactions [Nothing helps with pain getting better] : Nothing helps with pain getting better [Sitting] : sitting [Standing] : standing [Walking] : walking [Exercising] : exercising [Stairs] : stairs [Physical therapy] : physical therapy [de-identified] : 10/10/22: Some relief from PT with ROM and strnth but continues to have pain daily - has to use a cane for ambuluation \par \par \par Previous doc:\par ( DOI 10/31/2008)\par Pt seeing Dr Pabon for years for her génesis knee injury at work - she is on disability now- she had a Rt TKA in 2016 - she is having pain with this has cont swelling sn has difficulty bending her knee - pain standing and kneeling- always has swelling in the knee- her left knee has pain with OA has had inj and cont to have pain - new rt knee clicking as well - was never sig improved after surgery -- stairs are very difficulty =she feels overall the rt knee is worse right\par 4/16/19: F/U MRI left knee. Had blood work done. Both knees still hurt.\par 6/10/19: No change - cont pain in both knees. No auth yet for HA inj left knee or brace right knee.\par 11/24/20: Progressively worsening pain of both knees. Previously prescribed mobic did not give her relief. \par 12/18/20: F/U MRI both knees. Cortisone inj left knee helped for 3 days.\par 1/26/21: Orthovisc #1 left knee.\par 2/2/21: Orthovisc #2 left knee.\par 2/9/21: Orthovisc #3 left knee.\par 2/22/21: orthovisc #4 left knee.\par 11/23/21: f/up for bilateral knees. she was seen by Dr. Ryan on 10/11/21 as she was having increasing pain. She was given a CSI for the L knee which was beneficial for 2 days. She is complaining of increasing pain and loss of ROM in the left knee. She is also having medial right knee pain. She has not yet been approved for PT.\par 12/13/21: F/u review CT results.\par 1/21/22: continued and worsening pain in the left knee. She was recently told she needs spinal surgery and would like to continue with PT for now.\par 4/11/22: Continued and worsening pain in b/l knees. Has not been able to attend PT due to authorization issues. Prev inj provided mild relief. \par 6/13/22: continued and worsening pain in b/l knees- currently, the right is worse than the left. She had delayed PT course due to auth issues\par 7/11/22: No change, since last visit.  Has not had any PT auth yet.  States there were no records received by  at her last hearing.\par 8/22/22: No change since last visit - just got auth for PT and is scheduled to start this week.\par 9/26/22: Issue with auth for left TKA surgery last month was cancelled.  Worsening pain, currently in PT. [] : no [de-identified] : Dr. Fuentes [de-identified] : knee surgery  [de-identified] : patient is doing physical therapy\par

## 2022-10-10 NOTE — REASON FOR VISIT
[FreeTextEntry2] : 10/10/2022 :NIMA PINON , a 51 year old female, presents today for bilateral knee pain \par

## 2022-10-10 NOTE — ASSESSMENT
[FreeTextEntry1] : Rt Total knee- painful ? mild mid flex instability otherwise looks good - we will get blood work and try a brace to see if that helps - her left knee only has mild OA so I do not see benefit for TKA at this point - for now we will focus on rt as that is the more painful knee\par 4/16/19: MRI showing lateral and PF OA, no meniscus tear seen. Blood work neg. She is not a candidate for arthroscopy and not enough damage yet for left TKA. Long time discussion regarding outcomes of revision for right knee but for now will just try orthovisc left knee.\par 6/10/19: Auth pending for HA inj left knee and HKB right knee. For now will try PT and Mobic.\par 11/24/20: Continued pain the right knee at the proximal aspect of patella and quad insertion, feels that she has small defect there. Will get MRI of the right knee to look at quad tendon and patella component. Left knee has moderate OA that has progressively gotten worse. Will try injection today. Will also repeat MRI of the left knee to look for progression of her previous injury and to evaluate fibular head lesion to confirm no change.\par 12/18/20: Short term relief from left knee inj - MRI with sig PF OA, recc HA injections. 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 Start PT for both knees.\par 11/23/21: R TKA hardware in place; she continues to have R knee pain. Increased left knee pain and loss of ROM. Discussed pain medication vs. L TKA. She has done CSI and HA injections. Most recent CSI 10/11/21 provided minimal relief. Advised her pain may be idiopathic. Patient would like to proceed with L TKA. 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 Planning to proceed with L TKA. Will send to oncologist to confirm nothing needs to be done for fibular head at time of TKA. waiting for Auth for TKA\par 1/21/22: Evaluation of the lesion is begin as per the oncologist. She is planning for TKA after her family issues resolve. She also is being treated for her cspine. Treated with CSI today to alleviate symptoms in the mean time\par 4/11/22: overall right TKA still has significant pain and complex postop course due to lack of PT. Will continue to monitor this.  She has significant OA left knee and is preparing for TKA once she feels she is able to and will continue PT on b/l knees. - Discussed not a candidate for Rev TKA at this time with unknow cause of pain  \par 6/13/22: b/l knee still continued pain, cont PT for both knee building strength plan for L TKA benji assisted waiting for auth- I am conc right knee still gives her pain w/o obbvi reason. Continue to work this up for underlying cause. CT scan showed bone cyst was told it was benign. \par 7/11/22: No change - still with pain in both knees.  Again recc PT to work on strengthening and reeval after 4-6 weeks of treatment.\par 8/22/22: Starting PT this week for both knees and will then reeval.  Still painful right TKA if no improvement will get further workup while we await auth for left TKA.\par 9/26/22: Left knee inj today for acute relief, cont PT and reeval in 6 weeks.  I still feel we should proceed with TKA but will try some more conservative treatments for now while we wait for auth.\par \par 10/10/22: Continues to have pain and will likely need surgery in the new year. Will continue PT for now and FU in 4 weeks.

## 2022-10-10 NOTE — DISCUSSION/SUMMARY
[de-identified] : The natural progression of Osteoarthritis was explained to the patient.  We discussed the possible treatment options from conservative to operative.  These included NSAIDS, Glucosamine and Chondrotin sulfate, and Physical Therapy as well different types of injections.  We also discussed that at some point they may progress to needed a TKA.  Information and pamphlets were given when appropriate.\par \par Patient Complains of pain in Knee with a level that often reaches greater than a 8/10. The Pain has been\par progressively worsening of his/her treatment coarse. The pain has interfered with their ADLs and worsens with\par weight bearing. On exam they often have episodes of swelling/effusion with limited ROM. Pain worsens with ROM\par passive and active and I can palpate crepitus.\par  X rays were reviewed with the patient and they show joint space narrowing, subchondral sclerosis,\par osteophyte formation, and subchondral cysts.\par  After a period of more than 12 weeks physical therapy or exercise program done with me or another\par treating physician they have continued pain. The patient has failed a trial of NSAID medication or pain relieves if\par they were unable to tolerate NSAID medications as well as a series of injection, steroid or Hyaluronic Acid. After a\par long discussion with the patient both the patient and I have decided we have exhausted all forms of less radical\par treatments and they would like to proceed with Total Knee Replacement\par \par We discussed my findings and the natural history of their condition.  We talked about the details of the proposed surgery and the recovery.  We discussed the material risks, possible benefits and alternatives to surgery.  The risks include but are not limited to infection, bleeding and possible need for blood transfusion, fracture, bowel blockage, bladder retention or infection, need for reoperation, stiffness and/or limited range of motion, possible damage to nerves and blood vessels, failure of fixation of components, risk of deep vein thromboses and pulmonary embolism, wound healing problems, dislocation, and possible leg length discrepancy.  Although incredibly rare, we also discussed the risks of a cardiac event, stroke and even death during, or following, the surgery.  We discussed the type of implants the patient will be receiving and the type of fixation that will be used, as well as whether a robot or computer navigation aide will be used.  The patient understands they will need medical clearance and will attend a preoperative joint education class.  We also discussed the type of anesthesia they will receive, and the risks associated with hospital or rehab length of stay, obesity, diabetes and smoking.\par \par Entered by Natalia Steel acting as scribe.\par

## 2022-11-08 ENCOUNTER — FORM ENCOUNTER (OUTPATIENT)
Age: 52
End: 2022-11-08

## 2022-11-14 ENCOUNTER — APPOINTMENT (OUTPATIENT)
Dept: ORTHOPEDIC SURGERY | Facility: CLINIC | Age: 52
End: 2022-11-14

## 2022-11-14 VITALS — WEIGHT: 193 LBS | BODY MASS INDEX: 31.02 KG/M2 | HEIGHT: 66 IN

## 2022-11-14 VITALS — WEIGHT: 193 LBS | HEIGHT: 66 IN | BODY MASS INDEX: 31.02 KG/M2

## 2022-11-14 DIAGNOSIS — T84.84XD PAIN DUE TO INTERNAL ORTHOPEDIC PROSTHETIC DEVICES, IMPLANTS AND GRAFTS, SUBSEQUENT ENCOUNTER: ICD-10-CM

## 2022-11-14 DIAGNOSIS — Z96.651 PAIN DUE TO INTERNAL ORTHOPEDIC PROSTHETIC DEVICES, IMPLANTS AND GRAFTS, SUBSEQUENT ENCOUNTER: ICD-10-CM

## 2022-11-14 PROCEDURE — 99072 ADDL SUPL MATRL&STAF TM PHE: CPT

## 2022-11-14 PROCEDURE — 99213 OFFICE O/P EST LOW 20 MIN: CPT

## 2022-11-14 NOTE — DISCUSSION/SUMMARY
[de-identified] : The natural progression of Osteoarthritis was explained to the patient.  We discussed the possible treatment options from conservative to operative.  These included NSAIDS, Glucosamine and Chondrotin sulfate, and Physical Therapy as well different types of injections.  We also discussed that at some point they may progress to needed a TKA.  Information and pamphlets were given when appropriate.\par \par Patient Complains of pain in Knee with a level that often reaches greater than a 8/10. The Pain has been\par progressively worsening of his/her treatment coarse. The pain has interfered with their ADLs and worsens with\par weight bearing. On exam they often have episodes of swelling/effusion with limited ROM. Pain worsens with ROM\par passive and active and I can palpate crepitus.\par  X rays were reviewed with the patient and they show joint space narrowing, subchondral sclerosis,\par osteophyte formation, and subchondral cysts.\par  After a period of more than 12 weeks physical therapy or exercise program done with me or another\par treating physician they have continued pain. The patient has failed a trial of NSAID medication or pain relieves if\par they were unable to tolerate NSAID medications as well as a series of injection, steroid or Hyaluronic Acid. After a\par long discussion with the patient both the patient and I have decided we have exhausted all forms of less radical\par treatments and they would like to proceed with Total Knee Replacement\par \par We discussed my findings and the natural history of their condition.  We talked about the details of the proposed surgery and the recovery.  We discussed the material risks, possible benefits and alternatives to surgery.  The risks include but are not limited to infection, bleeding and possible need for blood transfusion, fracture, bowel blockage, bladder retention or infection, need for reoperation, stiffness and/or limited range of motion, possible damage to nerves and blood vessels, failure of fixation of components, risk of deep vein thromboses and pulmonary embolism, wound healing problems, dislocation, and possible leg length discrepancy.  Although incredibly rare, we also discussed the risks of a cardiac event, stroke and even death during, or following, the surgery.  We discussed the type of implants the patient will be receiving and the type of fixation that will be used, as well as whether a robot or computer navigation aide will be used.  The patient understands they will need medical clearance and will attend a preoperative joint education class.  We also discussed the type of anesthesia they will receive, and the risks associated with hospital or rehab length of stay, obesity, diabetes and smoking.\par \par Entered by Natalia Steel acting as scribe.\par

## 2022-11-14 NOTE — REASON FOR VISIT
[FreeTextEntry2] : 10/10/2022 :NIMA PINON , a 51 year old female, presents today for bilateral knee pain \par 11/14/2022 Follow up : Korey-knees\par \par

## 2022-11-14 NOTE — ASSESSMENT
[FreeTextEntry1] : Rt Total knee- painful ? mild mid flex instability otherwise looks good - we will get blood work and try a brace to see if that helps - her left knee only has mild OA so I do not see benefit for TKA at this point - for now we will focus on rt as that is the more painful knee\par 4/16/19: MRI showing lateral and PF OA, no meniscus tear seen. Blood work neg. She is not a candidate for arthroscopy and not enough damage yet for left TKA. Long time discussion regarding outcomes of revision for right knee but for now will just try orthovisc left knee.\par 6/10/19: Auth pending for HA inj left knee and HKB right knee. For now will try PT and Mobic.\par 11/24/20: Continued pain the right knee at the proximal aspect of patella and quad insertion, feels that she has small defect there. Will get MRI of the right knee to look at quad tendon and patella component. Left knee has moderate OA that has progressively gotten worse. Will try injection today. Will also repeat MRI of the left knee to look for progression of her previous injury and to evaluate fibular head lesion to confirm no change.\par 12/18/20: Short term relief from left knee inj - MRI with sig PF OA, recc HA injections. 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 Start PT for both knees.\par 11/23/21: R TKA hardware in place; she continues to have R knee pain. Increased left knee pain and loss of ROM. Discussed pain medication vs. L TKA. She has done CSI and HA injections. Most recent CSI 10/11/21 provided minimal relief. Advised her pain may be idiopathic. Patient would like to proceed with L TKA. 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 Planning to proceed with L TKA. Will send to oncologist to confirm nothing needs to be done for fibular head at time of TKA. waiting for Auth for TKA\par 1/21/22: Evaluation of the lesion is begin as per the oncologist. She is planning for TKA after her family issues resolve. She also is being treated for her cspine. Treated with CSI today to alleviate symptoms in the mean time\par 4/11/22: overall right TKA still has significant pain and complex postop course due to lack of PT. Will continue to monitor this.  She has significant OA left knee and is preparing for TKA once she feels she is able to and will continue PT on b/l knees. - Discussed not a candidate for Rev TKA at this time with unknow cause of pain  \par 6/13/22: b/l knee still continued pain, cont PT for both knee building strength plan for L TKA benji assisted waiting for auth- I am conc right knee still gives her pain w/o obbvi reason. Continue to work this up for underlying cause. CT scan showed bone cyst was told it was benign. \par 7/11/22: No change - still with pain in both knees.  Again recc PT to work on strengthening and reeval after 4-6 weeks of treatment.\par 8/22/22: Starting PT this week for both knees and will then reeval.  Still painful right TKA if no improvement will get further workup while we await auth for left TKA.\par 9/26/22: Left knee inj today for acute relief, cont PT and reeval in 6 weeks.  I still feel we should proceed with TKA but will try some more conservative treatments for now while we wait for auth.\par 10/10/22: Continues to have pain and will likely need surgery in the new year. Will continue PT for now and FU in 4 weeks. \par \par 11/14/22: She is planning for L TKA in the new year. She continues to have pain in the right knee but will re-eval this pain after L TKA and discuss options. At this point, I see no obvious signs for pain in her right TKA at this time and discussed this may be her body's reaction to knee replacements.She understands this and is aware she may feel similar with her left knee but she feels she cannot delay surgery as she cont to loose funcition in the knee. Risks and benefits again discussed and all questions answered. CT reviewed: 2 degrees valgus femur and 3 degrees varus tibia.

## 2022-11-14 NOTE — HISTORY OF PRESENT ILLNESS
[Gradual] : gradual [9] : 9 [Dull/Aching] : dull/aching [Sharp] : sharp [Shooting] : shooting [Constant] : constant [Household chores] : household chores [Leisure] : leisure [Work] : work [Sleep] : sleep [Sexual activity] : sexual activity [Social interactions] : social interactions [Nothing helps with pain getting better] : Nothing helps with pain getting better [Sitting] : sitting [Standing] : standing [Walking] : walking [Exercising] : exercising [Stairs] : stairs [Physical therapy] : physical therapy [Not working due to injury] : Work status: not working due to injury [de-identified] : 11/14/22: Continued and worsening pain in b/l knees. She is scheduled for surgery in the new year. Having trouble with ADL's. Has been doing HEP.\par \par Previous doc:\par ( DOI 10/31/2008)\par Pt seeing Dr Pabon for years for her génesis knee injury at work - she is on disability now- she had a Rt TKA in 2016 - she is having pain with this has cont swelling sn has difficulty bending her knee - pain standing and kneeling- always has swelling in the knee- her left knee has pain with OA has had inj and cont to have pain - new rt knee clicking as well - was never sig improved after surgery -- stairs are very difficulty =she feels overall the rt knee is worse right\par 4/16/19: F/U MRI left knee. Had blood work done. Both knees still hurt.\par 6/10/19: No change - cont pain in both knees. No auth yet for HA inj left knee or brace right knee.\par 11/24/20: Progressively worsening pain of both knees. Previously prescribed mobic did not give her relief. \par 12/18/20: F/U MRI both knees. Cortisone inj left knee helped for 3 days.\par 1/26/21: Orthovisc #1 left knee.\par 2/2/21: Orthovisc #2 left knee.\par 2/9/21: Orthovisc #3 left knee.\par 2/22/21: orthovisc #4 left knee.\par 11/23/21: f/up for bilateral knees. she was seen by Dr. Ryan on 10/11/21 as she was having increasing pain. She was given a CSI for the L knee which was beneficial for 2 days. She is complaining of increasing pain and loss of ROM in the left knee. She is also having medial right knee pain. She has not yet been approved for PT.\par 12/13/21: F/u review CT results.\par 1/21/22: continued and worsening pain in the left knee. She was recently told she needs spinal surgery and would like to continue with PT for now.\par 4/11/22: Continued and worsening pain in b/l knees. Has not been able to attend PT due to authorization issues. Prev inj provided mild relief. \par 6/13/22: continued and worsening pain in b/l knees- currently, the right is worse than the left. She had delayed PT course due to auth issues\par 7/11/22: No change, since last visit.  Has not had any PT auth yet.  States there were no records received by  at her last hearing.\par 8/22/22: No change since last visit - just got auth for PT and is scheduled to start this week.\par 9/26/22: Issue with auth for left TKA surgery last month was cancelled.  Worsening pain, currently in PT.\par 10/10/22: Some relief from PT with ROM and strnth but continues to have pain daily - has to use a cane for ambuluation  [] : no [FreeTextEntry1] : B/L KNEES [FreeTextEntry3] : 10/31/2008 [FreeTextEntry5] : 11/14/2022 Ms. NIMALAURA PINON F  here for eval of the génesis knees. Patient stated they feel worst since their last visit.\par \par  [de-identified] : Dr. Fuentes [de-identified] : knee surgery  [de-identified] : pt, injections

## 2022-11-14 NOTE — IMAGING
[de-identified] : left knee:\par 2 degrees valgus femur \par 3 degrees varus tibia\par medial joint line tenderness\par lateral joint line tenderness\par crepitus\par 5/5\par NVI\par \par right knee:\par well healed incision

## 2022-11-14 NOTE — REVIEW OF SYSTEMS
[Arthralgia] : arthralgia [Joint Pain] : joint pain [Joint Swelling] : joint swelling [Negative] : Heme/Lymph [Joint Stiffness] : joint stiffness

## 2022-11-28 ENCOUNTER — APPOINTMENT (OUTPATIENT)
Dept: ORTHOPEDIC SURGERY | Facility: CLINIC | Age: 52
End: 2022-11-28

## 2022-12-30 ENCOUNTER — OUTPATIENT (OUTPATIENT)
Dept: OUTPATIENT SERVICES | Facility: HOSPITAL | Age: 52
LOS: 1 days | Discharge: HOME | End: 2022-12-30
Payer: OTHER MISCELLANEOUS

## 2022-12-30 VITALS
RESPIRATION RATE: 17 BRPM | HEART RATE: 81 BPM | WEIGHT: 200.84 LBS | DIASTOLIC BLOOD PRESSURE: 87 MMHG | SYSTOLIC BLOOD PRESSURE: 126 MMHG | TEMPERATURE: 98 F | HEIGHT: 66 IN | OXYGEN SATURATION: 96 %

## 2022-12-30 DIAGNOSIS — Z96.651 PRESENCE OF RIGHT ARTIFICIAL KNEE JOINT: Chronic | ICD-10-CM

## 2022-12-30 DIAGNOSIS — Z90.49 ACQUIRED ABSENCE OF OTHER SPECIFIED PARTS OF DIGESTIVE TRACT: Chronic | ICD-10-CM

## 2022-12-30 DIAGNOSIS — Z98.890 OTHER SPECIFIED POSTPROCEDURAL STATES: Chronic | ICD-10-CM

## 2022-12-30 DIAGNOSIS — M17.12 UNILATERAL PRIMARY OSTEOARTHRITIS, LEFT KNEE: ICD-10-CM

## 2022-12-30 DIAGNOSIS — Z01.818 ENCOUNTER FOR OTHER PREPROCEDURAL EXAMINATION: ICD-10-CM

## 2022-12-30 DIAGNOSIS — Z98.84 BARIATRIC SURGERY STATUS: Chronic | ICD-10-CM

## 2022-12-30 DIAGNOSIS — F32.A DEPRESSION, UNSPECIFIED: ICD-10-CM

## 2022-12-30 LAB
A1C WITH ESTIMATED AVERAGE GLUCOSE RESULT: 5.6 % — SIGNIFICANT CHANGE UP (ref 4–5.6)
ALBUMIN SERPL ELPH-MCNC: 3.8 G/DL — SIGNIFICANT CHANGE UP (ref 3.3–5)
ALP SERPL-CCNC: 108 U/L — SIGNIFICANT CHANGE UP (ref 40–120)
ALT FLD-CCNC: 35 U/L — SIGNIFICANT CHANGE UP (ref 12–78)
ANION GAP SERPL CALC-SCNC: 4 MMOL/L — LOW (ref 5–17)
APTT BLD: 35.3 SEC — SIGNIFICANT CHANGE UP (ref 27.5–35.5)
AST SERPL-CCNC: 24 U/L — SIGNIFICANT CHANGE UP (ref 15–37)
BASOPHILS # BLD AUTO: 0.02 K/UL — SIGNIFICANT CHANGE UP (ref 0–0.2)
BASOPHILS NFR BLD AUTO: 0.3 % — SIGNIFICANT CHANGE UP (ref 0–2)
BILIRUB SERPL-MCNC: 0.4 MG/DL — SIGNIFICANT CHANGE UP (ref 0.2–1.2)
BLD GP AB SCN SERPL QL: SIGNIFICANT CHANGE UP
BUN SERPL-MCNC: 14 MG/DL — SIGNIFICANT CHANGE UP (ref 7–23)
CALCIUM SERPL-MCNC: 9.3 MG/DL — SIGNIFICANT CHANGE UP (ref 8.5–10.1)
CHLORIDE SERPL-SCNC: 107 MMOL/L — SIGNIFICANT CHANGE UP (ref 96–108)
CO2 SERPL-SCNC: 31 MMOL/L — SIGNIFICANT CHANGE UP (ref 22–31)
CREAT SERPL-MCNC: 0.72 MG/DL — SIGNIFICANT CHANGE UP (ref 0.5–1.3)
EGFR: 101 ML/MIN/1.73M2 — SIGNIFICANT CHANGE UP
EOSINOPHIL # BLD AUTO: 0.3 K/UL — SIGNIFICANT CHANGE UP (ref 0–0.5)
EOSINOPHIL NFR BLD AUTO: 4.3 % — SIGNIFICANT CHANGE UP (ref 0–6)
ESTIMATED AVERAGE GLUCOSE: 114 MG/DL — SIGNIFICANT CHANGE UP (ref 68–114)
GLUCOSE SERPL-MCNC: 94 MG/DL — SIGNIFICANT CHANGE UP (ref 70–99)
HCT VFR BLD CALC: 42.4 % — SIGNIFICANT CHANGE UP (ref 34.5–45)
HGB BLD-MCNC: 13.5 G/DL — SIGNIFICANT CHANGE UP (ref 11.5–15.5)
IMM GRANULOCYTES NFR BLD AUTO: 0.3 % — SIGNIFICANT CHANGE UP (ref 0–0.9)
INR BLD: 0.88 RATIO — SIGNIFICANT CHANGE UP (ref 0.88–1.16)
LYMPHOCYTES # BLD AUTO: 1.68 K/UL — SIGNIFICANT CHANGE UP (ref 1–3.3)
LYMPHOCYTES # BLD AUTO: 24.1 % — SIGNIFICANT CHANGE UP (ref 13–44)
MCHC RBC-ENTMCNC: 30 PG — SIGNIFICANT CHANGE UP (ref 27–34)
MCHC RBC-ENTMCNC: 31.8 G/DL — LOW (ref 32–36)
MCV RBC AUTO: 94.2 FL — SIGNIFICANT CHANGE UP (ref 80–100)
MONOCYTES # BLD AUTO: 0.36 K/UL — SIGNIFICANT CHANGE UP (ref 0–0.9)
MONOCYTES NFR BLD AUTO: 5.2 % — SIGNIFICANT CHANGE UP (ref 2–14)
NEUTROPHILS # BLD AUTO: 4.58 K/UL — SIGNIFICANT CHANGE UP (ref 1.8–7.4)
NEUTROPHILS NFR BLD AUTO: 65.8 % — SIGNIFICANT CHANGE UP (ref 43–77)
NRBC # BLD: 0 /100 WBCS — SIGNIFICANT CHANGE UP (ref 0–0)
PLATELET # BLD AUTO: 293 K/UL — SIGNIFICANT CHANGE UP (ref 150–400)
POTASSIUM SERPL-MCNC: 4.6 MMOL/L — SIGNIFICANT CHANGE UP (ref 3.5–5.3)
POTASSIUM SERPL-SCNC: 4.6 MMOL/L — SIGNIFICANT CHANGE UP (ref 3.5–5.3)
PROT SERPL-MCNC: 7.1 GM/DL — SIGNIFICANT CHANGE UP (ref 6–8.3)
PROTHROM AB SERPL-ACNC: 10.4 SEC — LOW (ref 10.5–13.4)
RBC # BLD: 4.5 M/UL — SIGNIFICANT CHANGE UP (ref 3.8–5.2)
RBC # FLD: 13.8 % — SIGNIFICANT CHANGE UP (ref 10.3–14.5)
SODIUM SERPL-SCNC: 142 MMOL/L — SIGNIFICANT CHANGE UP (ref 135–145)
VIT D25+D1,25 OH+D1,25 PNL SERPL-MCNC: 53.8 PG/ML — SIGNIFICANT CHANGE UP (ref 19.9–79.3)
WBC # BLD: 6.96 K/UL — SIGNIFICANT CHANGE UP (ref 3.8–10.5)
WBC # FLD AUTO: 6.96 K/UL — SIGNIFICANT CHANGE UP (ref 3.8–10.5)

## 2022-12-30 PROCEDURE — 93010 ELECTROCARDIOGRAM REPORT: CPT

## 2022-12-30 NOTE — H&P PST ADULT - NSANTHOSAYNRD_GEN_A_CORE
No. ALINA screening performed.  STOP BANG Legend: 0-2 = LOW Risk; 3-4 = INTERMEDIATE Risk; 5-8 = HIGH Risk

## 2022-12-30 NOTE — H&P PST ADULT - HISTORY OF PRESENT ILLNESS
52F pmh hld, depression, c/o left knee pain after fall injury while at work on 10- here for PsT for scheduled robotic assisted left total knee arthroplasty with nayan  This patient denies any fever, cough, sob, flu like symptoms or travel outside of the [place] in the past 30 days

## 2022-12-30 NOTE — OCCUPATIONAL THERAPY INITIAL EVALUATION ADULT - NSOTDMEREC_GEN_A_CORE
Pt deferred 3/1 commode stating that she will buy a raised toilet seat with arms. Pt has a rolling walker and a cane at home.

## 2022-12-30 NOTE — PHYSICAL THERAPY INITIAL EVALUATION ADULT - ACTIVE RANGE OF MOTION EXAMINATION, REHAB EVAL
Left LE Active ROM was WFL (within functional limits)/Right LE Active ROM was WFL (within functional limits)

## 2022-12-30 NOTE — H&P PST ADULT - ASSESSMENT
52F pmh hld, depression, c/o left knee pain after fall injury while at work on 10- here for PsT for scheduled robotic assisted left total knee arthroplasty with nayan  HESHAMI SCORE [CLOT]    AGE RELATED RISK FACTORS                                                       MOBILITY RELATED FACTORS  [x ] Age 41-60 years                                            (1 Point)                  [ ] Bed rest                                                        (1 Point)  [ ] Age: 61-74 years                                           (2 Points)                 [ ] Plaster cast                                                   (2 Points)  [ ] Age= 75 years                                              (3 Points)                 [ ] Bed bound for more than 72 hours                 (2 Points)    DISEASE RELATED RISK FACTORS                                               GENDER SPECIFIC FACTORS  [ ] Edema in the lower extremities                       (1 Point)                  [ ] Pregnancy                                                     (1 Point)  [ ] Varicose veins                                               (1 Point)                  [ ] Post-partum < 6 weeks                                   (1 Point)             [ x] BMI > 25 Kg/m2                                            (1 Point)                  [ ] Hormonal therapy  or oral contraception          (1 Point)                 [ ] Sepsis (in the previous month)                        (1 Point)                  [ ] History of pregnancy complications                 (1 point)  [ ] Pneumonia or serious lung disease                                               [ ] Unexplained or recurrent                     (1 Point)           (in the previous month)                               (1 Point)  [ ] Abnormal pulmonary function test                     (1 Point)                 SURGERY RELATED RISK FACTORS  [ ] Acute myocardial infarction                              (1 Point)                 [ ]  Section                                             (1 Point)  [ ] Congestive heart failure (in the previous month)  (1 Point)               [ ] Minor surgery                                                  (1 Point)   [ ] Inflammatory bowel disease                             (1 Point)                 [ ] Arthroscopic surgery                                        (2 Points)  [ ] Central venous access                                      (2 Points)                [ ] General surgery lasting more than 45 minutes   (2 Points)       [ ] Stroke (in the previous month)                          (5 Points)               [ x] Elective arthroplasty                                         (5 Points)                                                                                                                                               HEMATOLOGY RELATED FACTORS                                                 TRAUMA RELATED RISK FACTORS  [ ] Prior episodes of VTE                                     (3 Points)                [ ] Fracture of the hip, pelvis, or leg                       (5 Points)  [ ] Positive family history for VTE                         (3 Points)                 [ ] Acute spinal cord injury (in the previous month)  (5 Points)  [ ] Prothrombin 08095 A                                     (3 Points)                 [ ] Paralysis  (less than 1 month)                             (5 Points)  [ ] Factor V Leiden                                             (3 Points)                  [ ] Multiple Trauma within 1 month                        (5 Points)  [ ] Lupus anticoagulants                                     (3 Points)                                                           [ ] Anticardiolipin antibodies                               (3 Points)                                                       [ ] High homocysteine in the blood                      (3 Points)                                             [ ] Other congenital or acquired thrombophilia      (3 Points)                                                [ ] Heparin induced thrombocytopenia                  (3 Points)                                          Total Score [   7       ]    Caprini Score 0 - 2:  Low Risk, No VTE Prophylaxis required for most patients, encourage ambulation  Caprini Score 3 - 6:  At Risk, pharmacologic VTE prophylaxis is indicated for most patients (in the absence of a contraindication)  Caprini Score Greater than or = 7:  High Risk, pharmacologic VTE prophylaxis is indicated for most patients (in the absence of a contraindication)

## 2022-12-30 NOTE — OCCUPATIONAL THERAPY INITIAL EVALUATION ADULT - ADDITIONAL COMMENTS
Pt lives with spouse and son (Who can assist post op as well as her mother) in a private house with 3 steps to enter with no handrails. Once inside, the pt main bedroom and bathroom is on that floor when entering. The pts bathroom has a tub/shower combination, fixed shower head, standard toilet seat and no grab bars. Patient stated that 3:1 commode will not fit her bathroom. Preferred the raised toilet seat with arms. Therapist explained that it might not be covered by insurance and patient agreed to pay out of pocket and privately purchase one. The pt ambulates with no device unless in a lot of pain in which the patient will use a standard cane. The pt reports that she owns a rolling walker and a pair of axillary crutches. The pt daily pain is a 8/10 at rest and a 10/10 with movement. The pt manages the pain with rest and elevation. The pt does not take any pain medication. The pt recently had outpatient PT about 1 month ago, hx of falls and has buckling of the knees.

## 2022-12-30 NOTE — H&P PST ADULT - NSICDXPASTMEDICALHX_GEN_ALL_CORE_FT
PAST MEDICAL HISTORY:  Gallbladder disease removed     PAST MEDICAL HISTORY:  Depression     Gallbladder disease removed    GERD (gastroesophageal reflux disease)     HLD (hyperlipidemia)     Obesity

## 2022-12-30 NOTE — PHYSICAL THERAPY INITIAL EVALUATION ADULT - ADDITIONAL COMMENTS
Patient endorses typical pain at best is 8, at worst is 10. Per patient, pain is worse with . Pain is relieved by taking   . Home set-up: lives with  in private house with steps with handrails to enter at front. Bedroom is at 2nd floor with steps to negotiate, left handrail. Bathroom is a step-in shower/shower-tub combination with grab rails, a standard/comfort height toilet seat, with fixed/retractable shower head. Endorses will be supported by post-op. Patient is currently using  with mobility. Reports owns a . Patient  is -handed and drives; wears glasses always/for reading. Patient reports/denies falls in past 6 months. Reports/Denies buckling.

## 2022-12-30 NOTE — PHYSICAL THERAPY INITIAL EVALUATION ADULT - PERTINENT HX OF CURRENT PROBLEM, REHAB EVAL
Patient attends Pre-Op Testing today following consult with  due to chronic pain to L knee. Significant surgical/medical histories of. Elective 1/18/23 is now scheduled in this facility for.

## 2022-12-30 NOTE — H&P PST ADULT - PROBLEM SELECTOR PLAN 1
Robotic assisted left total knee arthroplasty with nayan  labs - cbc,pt/ptt,bmp,t&s,nose cx,ekg  M/C required  preop 3 day hibiclens instruction reviewed and given .instructed on if  nose cx positive use mupuricin 5 days and checklist given  take routine meds DOS with sips of water. avoid NSAID and OTC supplements. verbalized understanding  information on proper nutrition , increase protein and better food choices provided in packet   ensure clear given  Anesthesiologist to review PST labs, EKG, required clearances and optimization for surgery.

## 2022-12-30 NOTE — OCCUPATIONAL THERAPY INITIAL EVALUATION ADULT - PERTINENT HX OF CURRENT PROBLEM, REHAB EVAL
L knee OA which impacts pts ability to perform functional tasks/transfers and mobility. Pt is scheduled for L TKR on 1/18/23.

## 2022-12-30 NOTE — H&P PST ADULT - NSICDXPASTSURGICALHX_GEN_ALL_CORE_FT
PAST SURGICAL HISTORY:  S/P cholecystectomy     S/P gastric bypass      PAST SURGICAL HISTORY:  H/O abdominoplasty     H/O hernia repair     H/O total knee replacement, right     S/P cholecystectomy     S/P gastric bypass     Status post breast reduction

## 2022-12-31 LAB
MRSA PCR RESULT.: SIGNIFICANT CHANGE UP
S AUREUS DNA NOSE QL NAA+PROBE: SIGNIFICANT CHANGE UP

## 2023-01-17 ENCOUNTER — FORM ENCOUNTER (OUTPATIENT)
Age: 53
End: 2023-01-17

## 2023-01-18 ENCOUNTER — TRANSCRIPTION ENCOUNTER (OUTPATIENT)
Age: 53
End: 2023-01-18

## 2023-01-18 ENCOUNTER — INPATIENT (INPATIENT)
Facility: HOSPITAL | Age: 53
LOS: 0 days | Discharge: HOME | End: 2023-01-19
Attending: ORTHOPAEDIC SURGERY | Admitting: ORTHOPAEDIC SURGERY
Payer: OTHER MISCELLANEOUS

## 2023-01-18 ENCOUNTER — APPOINTMENT (OUTPATIENT)
Dept: ORTHOPEDIC SURGERY | Facility: HOSPITAL | Age: 53
End: 2023-01-18
Payer: OTHER MISCELLANEOUS

## 2023-01-18 VITALS
DIASTOLIC BLOOD PRESSURE: 85 MMHG | TEMPERATURE: 98 F | SYSTOLIC BLOOD PRESSURE: 128 MMHG | RESPIRATION RATE: 17 BRPM | WEIGHT: 200.84 LBS | HEIGHT: 66 IN | OXYGEN SATURATION: 98 % | HEART RATE: 79 BPM

## 2023-01-18 DIAGNOSIS — Z90.49 ACQUIRED ABSENCE OF OTHER SPECIFIED PARTS OF DIGESTIVE TRACT: Chronic | ICD-10-CM

## 2023-01-18 DIAGNOSIS — Z98.890 OTHER SPECIFIED POSTPROCEDURAL STATES: Chronic | ICD-10-CM

## 2023-01-18 DIAGNOSIS — Z98.84 BARIATRIC SURGERY STATUS: Chronic | ICD-10-CM

## 2023-01-18 DIAGNOSIS — Z96.651 PRESENCE OF RIGHT ARTIFICIAL KNEE JOINT: Chronic | ICD-10-CM

## 2023-01-18 LAB
ANION GAP SERPL CALC-SCNC: 6 MMOL/L — SIGNIFICANT CHANGE UP (ref 5–17)
BUN SERPL-MCNC: 13 MG/DL — SIGNIFICANT CHANGE UP (ref 7–23)
CALCIUM SERPL-MCNC: 8.1 MG/DL — LOW (ref 8.5–10.1)
CHLORIDE SERPL-SCNC: 108 MMOL/L — SIGNIFICANT CHANGE UP (ref 96–108)
CO2 SERPL-SCNC: 27 MMOL/L — SIGNIFICANT CHANGE UP (ref 22–31)
CREAT SERPL-MCNC: 0.7 MG/DL — SIGNIFICANT CHANGE UP (ref 0.5–1.3)
EGFR: 104 ML/MIN/1.73M2 — SIGNIFICANT CHANGE UP
GLUCOSE SERPL-MCNC: 106 MG/DL — HIGH (ref 70–99)
HCG UR QL: NEGATIVE — SIGNIFICANT CHANGE UP
HCT VFR BLD CALC: 37.5 % — SIGNIFICANT CHANGE UP (ref 34.5–45)
HGB BLD-MCNC: 11.5 G/DL — SIGNIFICANT CHANGE UP (ref 11.5–15.5)
MCHC RBC-ENTMCNC: 29.3 PG — SIGNIFICANT CHANGE UP (ref 27–34)
MCHC RBC-ENTMCNC: 30.7 G/DL — LOW (ref 32–36)
MCV RBC AUTO: 95.4 FL — SIGNIFICANT CHANGE UP (ref 80–100)
NRBC # BLD: 0 /100 WBCS — SIGNIFICANT CHANGE UP (ref 0–0)
PLATELET # BLD AUTO: 228 K/UL — SIGNIFICANT CHANGE UP (ref 150–400)
POTASSIUM SERPL-MCNC: 4.4 MMOL/L — SIGNIFICANT CHANGE UP (ref 3.5–5.3)
POTASSIUM SERPL-SCNC: 4.4 MMOL/L — SIGNIFICANT CHANGE UP (ref 3.5–5.3)
RBC # BLD: 3.93 M/UL — SIGNIFICANT CHANGE UP (ref 3.8–5.2)
RBC # FLD: 13.6 % — SIGNIFICANT CHANGE UP (ref 10.3–14.5)
SODIUM SERPL-SCNC: 141 MMOL/L — SIGNIFICANT CHANGE UP (ref 135–145)
WBC # BLD: 6.07 K/UL — SIGNIFICANT CHANGE UP (ref 3.8–10.5)
WBC # FLD AUTO: 6.07 K/UL — SIGNIFICANT CHANGE UP (ref 3.8–10.5)

## 2023-01-18 PROCEDURE — 73560 X-RAY EXAM OF KNEE 1 OR 2: CPT | Mod: 26,LT

## 2023-01-18 PROCEDURE — 20610 DRAIN/INJ JOINT/BURSA W/O US: CPT | Mod: 59,LT

## 2023-01-18 PROCEDURE — 27447 TOTAL KNEE ARTHROPLASTY: CPT | Mod: AS,LT

## 2023-01-18 PROCEDURE — 20985 CPTR-ASST DIR MS PX: CPT

## 2023-01-18 PROCEDURE — 73700 CT LOWER EXTREMITY W/O DYE: CPT | Mod: 26,LT

## 2023-01-18 PROCEDURE — 27447 TOTAL KNEE ARTHROPLASTY: CPT | Mod: LT

## 2023-01-18 DEVICE — COMP PATELLA ASYMMETRIC X3 32X10MM: Type: IMPLANTABLE DEVICE | Site: LEFT | Status: FUNCTIONAL

## 2023-01-18 DEVICE — BASEPLATE TIB UNIV TRIATHLON SZ 3: Type: IMPLANTABLE DEVICE | Site: LEFT | Status: FUNCTIONAL

## 2023-01-18 DEVICE — MAKO BONE PIN 3.2MM X 110MM: Type: IMPLANTABLE DEVICE | Site: LEFT | Status: FUNCTIONAL

## 2023-01-18 DEVICE — MAKO BONE PIN 3.2MM X 140MM: Type: IMPLANTABLE DEVICE | Site: LEFT | Status: FUNCTIONAL

## 2023-01-18 DEVICE — FEMORAL CRUCIATE RETAINING: Type: IMPLANTABLE DEVICE | Site: LEFT | Status: FUNCTIONAL

## 2023-01-18 DEVICE — CEMENT SIMPLEX P 40GM: Type: IMPLANTABLE DEVICE | Site: LEFT | Status: FUNCTIONAL

## 2023-01-18 RX ORDER — LANOLIN ALCOHOL/MO/W.PET/CERES
3 CREAM (GRAM) TOPICAL AT BEDTIME
Refills: 0 | Status: DISCONTINUED | OUTPATIENT
Start: 2023-01-18 | End: 2023-01-19

## 2023-01-18 RX ORDER — ACETAMINOPHEN 500 MG
975 TABLET ORAL EVERY 8 HOURS
Refills: 0 | Status: DISCONTINUED | OUTPATIENT
Start: 2023-01-18 | End: 2023-01-19

## 2023-01-18 RX ORDER — SODIUM CHLORIDE 9 MG/ML
1000 INJECTION, SOLUTION INTRAVENOUS
Refills: 0 | Status: DISCONTINUED | OUTPATIENT
Start: 2023-01-18 | End: 2023-01-18

## 2023-01-18 RX ORDER — KETOROLAC TROMETHAMINE 30 MG/ML
30 SYRINGE (ML) INJECTION EVERY 8 HOURS
Refills: 0 | Status: DISCONTINUED | OUTPATIENT
Start: 2023-01-18 | End: 2023-01-19

## 2023-01-18 RX ORDER — HYDROMORPHONE HYDROCHLORIDE 2 MG/ML
0.5 INJECTION INTRAMUSCULAR; INTRAVENOUS; SUBCUTANEOUS ONCE
Refills: 0 | Status: DISCONTINUED | OUTPATIENT
Start: 2023-01-18 | End: 2023-01-19

## 2023-01-18 RX ORDER — ATORVASTATIN CALCIUM 80 MG/1
20 TABLET, FILM COATED ORAL AT BEDTIME
Refills: 0 | Status: DISCONTINUED | OUTPATIENT
Start: 2023-01-18 | End: 2023-01-19

## 2023-01-18 RX ORDER — CEFAZOLIN SODIUM 1 G
2000 VIAL (EA) INJECTION EVERY 8 HOURS
Refills: 0 | Status: COMPLETED | OUTPATIENT
Start: 2023-01-18 | End: 2023-01-19

## 2023-01-18 RX ORDER — SERTRALINE 25 MG/1
50 TABLET, FILM COATED ORAL DAILY
Refills: 0 | Status: DISCONTINUED | OUTPATIENT
Start: 2023-01-18 | End: 2023-01-19

## 2023-01-18 RX ORDER — SODIUM CHLORIDE 9 MG/ML
3 INJECTION INTRAMUSCULAR; INTRAVENOUS; SUBCUTANEOUS EVERY 8 HOURS
Refills: 0 | Status: DISCONTINUED | OUTPATIENT
Start: 2023-01-18 | End: 2023-01-18

## 2023-01-18 RX ORDER — SODIUM CHLORIDE 9 MG/ML
500 INJECTION INTRAMUSCULAR; INTRAVENOUS; SUBCUTANEOUS ONCE
Refills: 0 | Status: COMPLETED | OUTPATIENT
Start: 2023-01-18 | End: 2023-01-18

## 2023-01-18 RX ORDER — ONDANSETRON 8 MG/1
4 TABLET, FILM COATED ORAL ONCE
Refills: 0 | Status: DISCONTINUED | OUTPATIENT
Start: 2023-01-18 | End: 2023-01-18

## 2023-01-18 RX ORDER — ONDANSETRON 8 MG/1
4 TABLET, FILM COATED ORAL EVERY 6 HOURS
Refills: 0 | Status: DISCONTINUED | OUTPATIENT
Start: 2023-01-18 | End: 2023-01-19

## 2023-01-18 RX ORDER — ACETAMINOPHEN 500 MG
650 TABLET ORAL ONCE
Refills: 0 | Status: COMPLETED | OUTPATIENT
Start: 2023-01-18 | End: 2023-01-18

## 2023-01-18 RX ORDER — OXYCODONE HYDROCHLORIDE 5 MG/1
5 TABLET ORAL EVERY 4 HOURS
Refills: 0 | Status: DISCONTINUED | OUTPATIENT
Start: 2023-01-18 | End: 2023-01-19

## 2023-01-18 RX ORDER — PANTOPRAZOLE SODIUM 20 MG/1
40 TABLET, DELAYED RELEASE ORAL
Refills: 0 | Status: DISCONTINUED | OUTPATIENT
Start: 2023-01-18 | End: 2023-01-18

## 2023-01-18 RX ORDER — HYDROMORPHONE HYDROCHLORIDE 2 MG/ML
0.5 INJECTION INTRAMUSCULAR; INTRAVENOUS; SUBCUTANEOUS
Refills: 0 | Status: DISCONTINUED | OUTPATIENT
Start: 2023-01-18 | End: 2023-01-18

## 2023-01-18 RX ORDER — OXYCODONE HYDROCHLORIDE 5 MG/1
10 TABLET ORAL EVERY 4 HOURS
Refills: 0 | Status: DISCONTINUED | OUTPATIENT
Start: 2023-01-18 | End: 2023-01-19

## 2023-01-18 RX ORDER — MAGNESIUM HYDROXIDE 400 MG/1
30 TABLET, CHEWABLE ORAL DAILY
Refills: 0 | Status: DISCONTINUED | OUTPATIENT
Start: 2023-01-18 | End: 2023-01-19

## 2023-01-18 RX ORDER — FAMOTIDINE 10 MG/ML
20 INJECTION INTRAVENOUS
Refills: 0 | Status: DISCONTINUED | OUTPATIENT
Start: 2023-01-18 | End: 2023-01-19

## 2023-01-18 RX ORDER — HYDROMORPHONE HYDROCHLORIDE 2 MG/ML
1 INJECTION INTRAMUSCULAR; INTRAVENOUS; SUBCUTANEOUS
Refills: 0 | Status: DISCONTINUED | OUTPATIENT
Start: 2023-01-18 | End: 2023-01-18

## 2023-01-18 RX ORDER — SERTRALINE 25 MG/1
1 TABLET, FILM COATED ORAL
Qty: 0 | Refills: 0 | DISCHARGE

## 2023-01-18 RX ORDER — ACETAMINOPHEN 500 MG
1000 TABLET ORAL ONCE
Refills: 0 | Status: DISCONTINUED | OUTPATIENT
Start: 2023-01-18 | End: 2023-01-19

## 2023-01-18 RX ORDER — PHENTERMINE HCL 30 MG
1 CAPSULE ORAL
Qty: 0 | Refills: 0 | DISCHARGE

## 2023-01-18 RX ORDER — SODIUM CHLORIDE 9 MG/ML
1000 INJECTION INTRAMUSCULAR; INTRAVENOUS; SUBCUTANEOUS
Refills: 0 | Status: DISCONTINUED | OUTPATIENT
Start: 2023-01-18 | End: 2023-01-19

## 2023-01-18 RX ORDER — POLYETHYLENE GLYCOL 3350 17 G/17G
17 POWDER, FOR SOLUTION ORAL AT BEDTIME
Refills: 0 | Status: DISCONTINUED | OUTPATIENT
Start: 2023-01-18 | End: 2023-01-19

## 2023-01-18 RX ORDER — ROSUVASTATIN CALCIUM 5 MG/1
1 TABLET ORAL
Qty: 0 | Refills: 0 | DISCHARGE

## 2023-01-18 RX ORDER — FAMOTIDINE 10 MG/ML
1 INJECTION INTRAVENOUS
Qty: 0 | Refills: 0 | DISCHARGE

## 2023-01-18 RX ORDER — ASPIRIN/CALCIUM CARB/MAGNESIUM 324 MG
81 TABLET ORAL
Refills: 0 | Status: DISCONTINUED | OUTPATIENT
Start: 2023-01-19 | End: 2023-01-19

## 2023-01-18 RX ORDER — DEXAMETHASONE 0.5 MG/5ML
10 ELIXIR ORAL ONCE
Refills: 0 | Status: COMPLETED | OUTPATIENT
Start: 2023-01-19 | End: 2023-01-19

## 2023-01-18 RX ORDER — SENNA PLUS 8.6 MG/1
2 TABLET ORAL AT BEDTIME
Refills: 0 | Status: DISCONTINUED | OUTPATIENT
Start: 2023-01-18 | End: 2023-01-19

## 2023-01-18 RX ADMIN — OXYCODONE HYDROCHLORIDE 5 MILLIGRAM(S): 5 TABLET ORAL at 21:07

## 2023-01-18 RX ADMIN — Medication 650 MILLIGRAM(S): at 10:57

## 2023-01-18 RX ADMIN — SODIUM CHLORIDE 3 MILLILITER(S): 9 INJECTION INTRAMUSCULAR; INTRAVENOUS; SUBCUTANEOUS at 11:40

## 2023-01-18 RX ADMIN — OXYCODONE HYDROCHLORIDE 10 MILLIGRAM(S): 5 TABLET ORAL at 20:25

## 2023-01-18 RX ADMIN — Medication 30 MILLIGRAM(S): at 20:26

## 2023-01-18 RX ADMIN — SENNA PLUS 2 TABLET(S): 8.6 TABLET ORAL at 21:07

## 2023-01-18 RX ADMIN — Medication 975 MILLIGRAM(S): at 22:07

## 2023-01-18 RX ADMIN — Medication 100 MILLIGRAM(S): at 21:09

## 2023-01-18 RX ADMIN — POLYETHYLENE GLYCOL 3350 17 GRAM(S): 17 POWDER, FOR SOLUTION ORAL at 21:08

## 2023-01-18 RX ADMIN — Medication 975 MILLIGRAM(S): at 21:07

## 2023-01-18 RX ADMIN — SODIUM CHLORIDE 500 MILLILITER(S): 9 INJECTION INTRAMUSCULAR; INTRAVENOUS; SUBCUTANEOUS at 16:04

## 2023-01-18 RX ADMIN — Medication 30 MILLIGRAM(S): at 21:07

## 2023-01-18 RX ADMIN — ATORVASTATIN CALCIUM 20 MILLIGRAM(S): 80 TABLET, FILM COATED ORAL at 21:07

## 2023-01-18 RX ADMIN — Medication 3 MILLIGRAM(S): at 23:49

## 2023-01-18 RX ADMIN — OXYCODONE HYDROCHLORIDE 5 MILLIGRAM(S): 5 TABLET ORAL at 22:07

## 2023-01-18 RX ADMIN — OXYCODONE HYDROCHLORIDE 10 MILLIGRAM(S): 5 TABLET ORAL at 21:07

## 2023-01-18 NOTE — PHYSICAL THERAPY INITIAL EVALUATION ADULT - DIAGNOSIS, PT EVAL
weakness, diff walking/impaired ambulation Screen#: [id number]  Screen Date: 2023  Screen Comment: N/A     Screen#: [id number]  Screen Date: 2023  Screen Comment: N/A    Screen#: [id number]  Screen Date: 2023  Screen Comment: N/A

## 2023-01-18 NOTE — PROGRESS NOTE ADULT - SUBJECTIVE AND OBJECTIVE BOX
Patient is 52y y/o Female s/p L TKA POD#0  Patient is seen and examined at bedside.   Pt tolerated procedure well without any intra-op complications.    Pain is controlled.  Denies CP/SOB/Dizziness/N/V/D/HA.     Vital Signs Last 24 Hrs  T(C): 36.3 (18 Jan 2023 17:40), Max: 36.7 (18 Jan 2023 10:56)  T(F): 97.4 (18 Jan 2023 17:40), Max: 98 (18 Jan 2023 10:56)  HR: 67 (18 Jan 2023 17:40) (60 - 79)  BP: 112/72 (18 Jan 2023 17:40) (94/61 - 128/85)  BP(mean): --  RR: 12 (18 Jan 2023 17:40) (12 - 17)  SpO2: 97% (18 Jan 2023 17:40) (95% - 100%)    Parameters below as of 18 Jan 2023 15:55  Patient On (Oxygen Delivery Method): nasal cannula  O2 Flow (L/min): 3        PHYSICAL EXAM:  General: A&Ox3 NAD  LLE: Dressing C/D/I with ACE wrap in place. Motor intact + EHL/FHL/TA/GS.  Sensation is grossly intact.  Extremity warm, compartments soft, compressible. No calf tenderness. DP 2+   RLE: Motor intact +EHL/FHL/TA/GS. Sensation is grossly intact. Extremity warm, compartments soft, compressible. No calf tenderness. DP2+    Labs:                          11.5   6.07  )-----------( 228      ( 18 Jan 2023 16:05 )             37.5       01-18    141  |  108  |  13  ----------------------------<  106<H>  4.4   |  27  |  0.70    Ca    8.1<L>      18 Jan 2023 16:05        A/P: Patient is a 52y y/o Female s/p L TKA, POD # 0  -wound care, knee extension/leg elevation, cryocuff, isometric exercises, new medications reviewed with pt  -Pain control/analgesia  -Inc spirometry reviewed with pt, demonstrated competence  -DVT prophylaxis with Venodynes/Aspirin 81 BID  -F/U AM Labs  -PT/OT/WBAT  -prophylactic Antibiotic  -medical consult  -DC planning

## 2023-01-18 NOTE — PHYSICAL THERAPY INITIAL EVALUATION ADULT - NSACTIVITYREC_GEN_A_PT
Pt tolerated session well and was left seated OOB in chair in NAD.  RN Kate aware of pt's status and needs

## 2023-01-18 NOTE — DISCHARGE NOTE PROVIDER - NSDCFUADDAPPT_GEN_ALL_CORE_FT
Follow up with your surgeon in two weeks. Call for appointment.    If you need more pain medications, call your surgeon's office. For medication refills or authorizations call [phone] xt [phone]    Call and schedule a follow up appointment with your primary care physician for repeat blood work (CBC and BMP) for post hospital discharge follow-up care.    Call your surgeon if you have increased redness/pain/drainage or fever. Return to ER for shortness of breath/calf tenderness.

## 2023-01-18 NOTE — DISCHARGE NOTE PROVIDER - NSDCFUSCHEDAPPT_GEN_ALL_CORE_FT
Mike Mendoza  [facility]ician [facility]  [address]  Scheduled Appointment: 01/31/2023

## 2023-01-18 NOTE — OCCUPATIONAL THERAPY INITIAL EVALUATION ADULT - ADDITIONAL COMMENTS
Pt lives with spouse and son (Who can assist post op as well as her mother) in a private house with 3 steps to enter with no handrails. Once inside, the pt main bedroom and bathroom is on that floor when entering. The pts bathroom has a tub/shower combination, fixed shower head, standard toilet seat and no grab bars. Patient purchased raised toilet seat with arms. The pt ambulates with no device unless in a lot of pain in which the patient will use a standard cane. The pt reports that she owns a rolling walker and a pair of axillary crutches. The pt recently had outpatient PT about 1 month ago, hx of falls and has buckling of the knees.

## 2023-01-18 NOTE — OCCUPATIONAL THERAPY INITIAL EVALUATION ADULT - GENERAL OBSERVATIONS, REHAB EVAL
Pt encountered semisupine in bed, NAD, AXOX4, +heplock, +TEDs, +ACE L knee c/d/i, pt c/o pain s/p left TKA, family at bedside.

## 2023-01-18 NOTE — CONSULT NOTE ADULT - SUBJECTIVE AND OBJECTIVE BOX
NIMA PINON is a 52y Female s/p ROBOTIC ASSISTED LEFT TOTAL KNEE ARTHROPLASTY WITH KHADAR      w/ h/o Gallbladder disease    Obesity    HLD (hyperlipidemia)    GERD (gastroesophageal reflux disease)    Depression      denies any chest pain shortness of breath palpitation dizziness lightheadedness nausea vomiting fever or chills    S/P gastric bypass    S/P cholecystectomy    H/O hernia repair    Status post breast reduction    H/O total knee replacement, right    H/O abdominoplasty        SH: doesnot smoke or drink at this time    Cipro (Hives)  sulfa drugs (Pruritus)    acetaminophen     Tablet .. 975 milliGRAM(s) Oral every 8 hours  acetaminophen   IVPB .. 1000 milliGRAM(s) IV Intermittent once  atorvastatin 20 milliGRAM(s) Oral at bedtime  ceFAZolin   IVPB 2000 milliGRAM(s) IV Intermittent every 8 hours  famotidine    Tablet 20 milliGRAM(s) Oral two times a day  HYDROmorphone  Injectable 0.5 milliGRAM(s) IV Push once  ketorolac   Injectable 30 milliGRAM(s) IV Push every 8 hours  magnesium hydroxide Suspension 30 milliLiter(s) Oral daily PRN  melatonin 3 milliGRAM(s) Oral at bedtime PRN  multivitamin 1 Tablet(s) Oral daily  ondansetron Injectable 4 milliGRAM(s) IV Push every 6 hours PRN  oxyCODONE    IR 5 milliGRAM(s) Oral every 4 hours  oxyCODONE    IR 5 milliGRAM(s) Oral every 4 hours PRN  oxyCODONE    IR 10 milliGRAM(s) Oral every 4 hours PRN  polyethylene glycol 3350 17 Gram(s) Oral at bedtime  senna 2 Tablet(s) Oral at bedtime  sertraline 50 milliGRAM(s) Oral daily  sodium chloride 0.9%. 1000 milliLiter(s) IV Continuous <Continuous>    T(C): 36.6 (01-18-23 @ 19:30), Max: 36.7 (01-18-23 @ 10:56)  HR: 71 (01-18-23 @ 19:30) (60 - 79)  BP: 108/74 (01-18-23 @ 19:30) (94/61 - 128/85)  RR: 18 (01-18-23 @ 19:30) (12 - 18)  SpO2: 96% (01-18-23 @ 19:30) (93% - 100%)  HEENT unremarkable  neck no JVD or bruit  heart normal S1 S2 RRR no gallops or rubs  chest clear to auscultation  abd sof nontender non distended +bs  ext no calf tenderness    A/P   DVT PX  pain control  bowel regimen   wound care as per ortho  GI PX  antiemetics prn  incentive spirometer

## 2023-01-18 NOTE — OCCUPATIONAL THERAPY INITIAL EVALUATION ADULT - STRENGTHENING, PT EVAL
Pt will increase left lower extremity strength to 5/5 to improve functional strength needed to engage in functional tasks by 6-8 weeks

## 2023-01-18 NOTE — PHYSICAL THERAPY INITIAL EVALUATION ADULT - GAIT TRAINING, PT EVAL
In 1-2 days: independent with amb up to 200ft with a RW and up and down 3 steps with LEO with [place]om

## 2023-01-18 NOTE — DISCHARGE NOTE PROVIDER - NSDCFUADDINST_GEN_ALL_CORE_FT
Keep Prineo Dressing Clean, Dry and Intact. May shower with Prineo Dressing. Please do not scrub, soak, peel or pick at the prineo dressing. No creams, lotions, or oils over dressing. May shower and let water run over incision, no baths. Pat dry once out of shower. Dressing to be removed in office at follow up visit in 2 weeks. There are no staples or stitches that need to be removed.  If you notice any redness, irritation, or itching around the prineo dressing call the surgeon's office    Per Dr. Mendoza: may advance from walker as tolerated per discretion of physical therapist.     Keep knee straight while at rest. Elevate the leg as much as possible ("toes above the nose") to help control swelling. Make sure you get up and take a brief walk every two hours to help with circulation and prevent stiffness. Incentive spirometer 10X/hour. Cryocuff to help with pain/inflammation.

## 2023-01-18 NOTE — PHYSICAL THERAPY INITIAL EVALUATION ADULT - ADDITIONAL COMMENTS
Patient endorses typical pain at best is 8, at worst is 10. Per patient, pain is worse with. Pain is relieved by taking. Home set-up: lives with in private house with steps with handrails to enter at front. Bedroom is at 2nd floor with steps to negotiate, left handrail. Bathroom is a step-in shower/shower-tub combination with grab rails, a standard/comfort height toilet seat, with fixed/retractable shower head. Endorses will be supported by post-op. Patient is currently using  with mobility. Reports owns a . Patient  is -handed and drives; wears glasses always/for reading. Patient reports/denies falls in past 6 months. Reports/Denies buckling.

## 2023-01-18 NOTE — ASU PREOP CHECKLIST - ADVANCE DIRECTIVE ADDRESSED/READDRESSED
Called pt regarding rescheduling apt to tommorrow 8/22/19 per NP Ileana, no answer, Left voice message for pt to give the office a call back at [phone].     done

## 2023-01-18 NOTE — DISCHARGE NOTE PROVIDER - CARE PROVIDER_API CALL
Mike Mendoza)  Orthopaedic Surgery  [address] [place] [address]  Phone: [phone]  Fax: [phone]  Follow Up Time:

## 2023-01-18 NOTE — PATIENT PROFILE ADULT - FALL HARM RISK - HARM RISK INTERVENTIONS

## 2023-01-18 NOTE — DISCHARGE NOTE PROVIDER - HOSPITAL COURSE
52yFemale with history of OA presenting for L TKA by Dr. Mendoza on 1/18/2022. Risk and benefits of surgery were explained to the patient. The patient understood and agreed to proceed with surgery. Patient underwent the procedure with no intraoperative complications. Pt was brought in stable condition to the PACU. Once stable in PACU, pt was brought to the floor. During hospital stay pt was followed by Medicine, physical therapy, Home Care during this admission. Pt had an uneventful hospital course. Pt is stable for discharge to home 52yFemale with history of OA presenting for L TKA by Dr. Mendoza on 1/18/2022. Risk and benefits of surgery were explained to the patient. The patient understood and agreed to proceed with surgery. Patient underwent the procedure with no intraoperative complications. Pt was brought in stable condition to the PACU. Once stable in PACU, pt was brought to the floor. During hospital stay pt was followed by Medicine, physical therapy, Home Care during this admission. Pt had an uneventful hospital course. Pt is stable for discharge to home on POD#1.

## 2023-01-18 NOTE — DISCHARGE NOTE PROVIDER - NSDCMRMEDTOKEN_GEN_ALL_CORE_FT
famotidine 20 mg oral tablet: 1 tab(s) orally 2 times a day  phentermine 37.5 mg oral tablet: 1 tab(s) orally once a day  Please perform COVID PCR:   rosuvastatin 5 mg oral tablet: 1 tab(s) orally once a day  sertraline 50 mg oral tablet: 1 tab(s) orally once a day   Aspirin Enteric Coated 81 mg oral delayed release tablet: 1 tab(s) orally 2 times a day MDD:2  famotidine 20 mg oral tablet: 1 tab(s) orally 2 times a day  hydrocodone-acetaminophen 5 mg-325 mg oral tablet: 1-2 tab(s) orally every 4 hours, As Needed for pain MDD:6   Multiple Vitamins oral tablet: 1 tab(s) orally once a day  Narcan 4 mg/0.1 mL nasal spray: 4 milligram(s) intranasally once, repeat as necessary.   As needed. For suspected opiate overdose   Follow instructions on packet MDD:0.2 ml  pantoprazole 40 mg oral delayed release tablet: 1 tab(s) orally once a day (before a meal) MDD:1  phentermine 37.5 mg oral tablet: 1 tab(s) orally once a day  polyethylene glycol 3350 oral powder for reconstitution: 17 gram(s) orally once a day (at bedtime)  rosuvastatin 5 mg oral tablet: 1 tab(s) orally once a day  senna leaf extract oral tablet: 2 tab(s) orally once a day (at bedtime)  sertraline 50 mg oral tablet: 1 tab(s) orally once a day

## 2023-01-19 ENCOUNTER — TRANSCRIPTION ENCOUNTER (OUTPATIENT)
Age: 53
End: 2023-01-19

## 2023-01-19 VITALS
OXYGEN SATURATION: 98 % | SYSTOLIC BLOOD PRESSURE: 132 MMHG | RESPIRATION RATE: 16 BRPM | TEMPERATURE: 98 F | HEART RATE: 74 BPM | DIASTOLIC BLOOD PRESSURE: 80 MMHG

## 2023-01-19 LAB
ANION GAP SERPL CALC-SCNC: 4 MMOL/L — LOW (ref 5–17)
BUN SERPL-MCNC: 11 MG/DL — SIGNIFICANT CHANGE UP (ref 7–23)
CALCIUM SERPL-MCNC: 8.7 MG/DL — SIGNIFICANT CHANGE UP (ref 8.5–10.1)
CHLORIDE SERPL-SCNC: 107 MMOL/L — SIGNIFICANT CHANGE UP (ref 96–108)
CO2 SERPL-SCNC: 28 MMOL/L — SIGNIFICANT CHANGE UP (ref 22–31)
CREAT SERPL-MCNC: 0.59 MG/DL — SIGNIFICANT CHANGE UP (ref 0.5–1.3)
EGFR: 108 ML/MIN/1.73M2 — SIGNIFICANT CHANGE UP
GLUCOSE SERPL-MCNC: 113 MG/DL — HIGH (ref 70–99)
HCT VFR BLD CALC: 35.9 % — SIGNIFICANT CHANGE UP (ref 34.5–45)
HGB BLD-MCNC: 11.1 G/DL — LOW (ref 11.5–15.5)
MCHC RBC-ENTMCNC: 29.3 PG — SIGNIFICANT CHANGE UP (ref 27–34)
MCHC RBC-ENTMCNC: 30.9 G/DL — LOW (ref 32–36)
MCV RBC AUTO: 94.7 FL — SIGNIFICANT CHANGE UP (ref 80–100)
NRBC # BLD: 0 /100 WBCS — SIGNIFICANT CHANGE UP (ref 0–0)
PLATELET # BLD AUTO: 293 K/UL — SIGNIFICANT CHANGE UP (ref 150–400)
POTASSIUM SERPL-MCNC: 4.8 MMOL/L — SIGNIFICANT CHANGE UP (ref 3.5–5.3)
POTASSIUM SERPL-SCNC: 4.8 MMOL/L — SIGNIFICANT CHANGE UP (ref 3.5–5.3)
RBC # BLD: 3.79 M/UL — LOW (ref 3.8–5.2)
RBC # FLD: 13.4 % — SIGNIFICANT CHANGE UP (ref 10.3–14.5)
SODIUM SERPL-SCNC: 139 MMOL/L — SIGNIFICANT CHANGE UP (ref 135–145)
WBC # BLD: 13.57 K/UL — HIGH (ref 3.8–10.5)
WBC # FLD AUTO: 13.57 K/UL — HIGH (ref 3.8–10.5)

## 2023-01-19 PROCEDURE — 73560 X-RAY EXAM OF KNEE 1 OR 2: CPT | Mod: 26,LT

## 2023-01-19 RX ORDER — DIPHENHYDRAMINE HCL 50 MG
25 CAPSULE ORAL ONCE
Refills: 0 | Status: COMPLETED | OUTPATIENT
Start: 2023-01-19 | End: 2023-01-19

## 2023-01-19 RX ORDER — ASPIRIN/CALCIUM CARB/MAGNESIUM 324 MG
1 TABLET ORAL
Qty: 60 | Refills: 0
Start: 2023-01-19 | End: 2023-02-17

## 2023-01-19 RX ORDER — NALOXONE HYDROCHLORIDE 4 MG/.1ML
4 SPRAY NASAL
Qty: 1 | Refills: 0
Start: 2023-01-19 | End: 2023-01-19

## 2023-01-19 RX ORDER — POLYETHYLENE GLYCOL 3350 17 G/17G
17 POWDER, FOR SOLUTION ORAL
Qty: 0 | Refills: 0 | DISCHARGE
Start: 2023-01-19

## 2023-01-19 RX ORDER — SODIUM CHLORIDE 9 MG/ML
1000 INJECTION INTRAMUSCULAR; INTRAVENOUS; SUBCUTANEOUS ONCE
Refills: 0 | Status: COMPLETED | OUTPATIENT
Start: 2023-01-19 | End: 2023-01-19

## 2023-01-19 RX ORDER — PANTOPRAZOLE SODIUM 20 MG/1
1 TABLET, DELAYED RELEASE ORAL
Qty: 30 | Refills: 0
Start: 2023-01-19 | End: 2023-02-17

## 2023-01-19 RX ORDER — SENNA PLUS 8.6 MG/1
2 TABLET ORAL
Qty: 0 | Refills: 0 | DISCHARGE
Start: 2023-01-19

## 2023-01-19 RX ORDER — HYDROCODONE BITARTRATE AND ACETAMINOPHEN 7.5; 325 MG/15ML; MG/15ML
1 SOLUTION ORAL
Qty: 42 | Refills: 0
Start: 2023-01-19 | End: 2023-01-25

## 2023-01-19 RX ADMIN — OXYCODONE HYDROCHLORIDE 10 MILLIGRAM(S): 5 TABLET ORAL at 13:14

## 2023-01-19 RX ADMIN — Medication 975 MILLIGRAM(S): at 16:00

## 2023-01-19 RX ADMIN — Medication 81 MILLIGRAM(S): at 05:25

## 2023-01-19 RX ADMIN — Medication 30 MILLIGRAM(S): at 14:05

## 2023-01-19 RX ADMIN — OXYCODONE HYDROCHLORIDE 10 MILLIGRAM(S): 5 TABLET ORAL at 09:14

## 2023-01-19 RX ADMIN — OXYCODONE HYDROCHLORIDE 5 MILLIGRAM(S): 5 TABLET ORAL at 02:22

## 2023-01-19 RX ADMIN — OXYCODONE HYDROCHLORIDE 5 MILLIGRAM(S): 5 TABLET ORAL at 03:22

## 2023-01-19 RX ADMIN — Medication 102 MILLIGRAM(S): at 06:34

## 2023-01-19 RX ADMIN — Medication 975 MILLIGRAM(S): at 06:26

## 2023-01-19 RX ADMIN — OXYCODONE HYDROCHLORIDE 5 MILLIGRAM(S): 5 TABLET ORAL at 10:31

## 2023-01-19 RX ADMIN — Medication 975 MILLIGRAM(S): at 05:25

## 2023-01-19 RX ADMIN — OXYCODONE HYDROCHLORIDE 10 MILLIGRAM(S): 5 TABLET ORAL at 14:15

## 2023-01-19 RX ADMIN — Medication 975 MILLIGRAM(S): at 15:06

## 2023-01-19 RX ADMIN — Medication 1 TABLET(S): at 13:08

## 2023-01-19 RX ADMIN — MAGNESIUM HYDROXIDE 30 MILLILITER(S): 400 TABLET, CHEWABLE ORAL at 10:34

## 2023-01-19 RX ADMIN — OXYCODONE HYDROCHLORIDE 5 MILLIGRAM(S): 5 TABLET ORAL at 05:26

## 2023-01-19 RX ADMIN — FAMOTIDINE 20 MILLIGRAM(S): 10 INJECTION INTRAVENOUS at 05:26

## 2023-01-19 RX ADMIN — Medication 30 MILLIGRAM(S): at 06:26

## 2023-01-19 RX ADMIN — FAMOTIDINE 20 MILLIGRAM(S): 10 INJECTION INTRAVENOUS at 17:55

## 2023-01-19 RX ADMIN — OXYCODONE HYDROCHLORIDE 5 MILLIGRAM(S): 5 TABLET ORAL at 06:26

## 2023-01-19 RX ADMIN — OXYCODONE HYDROCHLORIDE 10 MILLIGRAM(S): 5 TABLET ORAL at 10:15

## 2023-01-19 RX ADMIN — Medication 81 MILLIGRAM(S): at 17:55

## 2023-01-19 RX ADMIN — OXYCODONE HYDROCHLORIDE 10 MILLIGRAM(S): 5 TABLET ORAL at 01:31

## 2023-01-19 RX ADMIN — SERTRALINE 50 MILLIGRAM(S): 25 TABLET, FILM COATED ORAL at 13:08

## 2023-01-19 RX ADMIN — SODIUM CHLORIDE 500 MILLILITER(S): 9 INJECTION INTRAMUSCULAR; INTRAVENOUS; SUBCUTANEOUS at 09:15

## 2023-01-19 RX ADMIN — OXYCODONE HYDROCHLORIDE 5 MILLIGRAM(S): 5 TABLET ORAL at 11:30

## 2023-01-19 RX ADMIN — OXYCODONE HYDROCHLORIDE 5 MILLIGRAM(S): 5 TABLET ORAL at 15:40

## 2023-01-19 RX ADMIN — SODIUM CHLORIDE 125 MILLILITER(S): 9 INJECTION INTRAMUSCULAR; INTRAVENOUS; SUBCUTANEOUS at 00:40

## 2023-01-19 RX ADMIN — OXYCODONE HYDROCHLORIDE 10 MILLIGRAM(S): 5 TABLET ORAL at 00:40

## 2023-01-19 RX ADMIN — Medication 100 MILLIGRAM(S): at 05:28

## 2023-01-19 RX ADMIN — Medication 25 MILLIGRAM(S): at 03:51

## 2023-01-19 RX ADMIN — Medication 30 MILLIGRAM(S): at 05:27

## 2023-01-19 RX ADMIN — Medication 30 MILLIGRAM(S): at 13:08

## 2023-01-19 RX ADMIN — Medication 10 MILLIGRAM(S): at 13:08

## 2023-01-19 RX ADMIN — OXYCODONE HYDROCHLORIDE 5 MILLIGRAM(S): 5 TABLET ORAL at 15:06

## 2023-01-19 NOTE — DISCHARGE NOTE NURSING/CASE MANAGEMENT/SOCIAL WORK - NSDCFUADDAPPT_GEN_ALL_CORE_FT
Follow up with your surgeon in two weeks. Call for appointment.    If you need more pain medications, call your surgeon's office. For medication refills or authorizations call [phone] xt [phone]    Call and schedule a follow up appointment with your primary care physician for repeat blood work (CBC and BMP) for post hospital discharge follow-up care.    Call your surgeon if you have increased redness/pain/drainage or fever. Return to ER for shortness of breath/calf tenderness.

## 2023-01-19 NOTE — DISCHARGE NOTE NURSING/CASE MANAGEMENT/SOCIAL WORK - PATIENT PORTAL LINK FT
You can access the FollowMyHealth Patient Portal offered by [facility] by registering at the following website: [email or web address]. By joining [facility]’s FollowMyHealth portal, you will also be able to view your health information using other applications (apps) compatible with our system.

## 2023-01-19 NOTE — PROGRESS NOTE ADULT - SUBJECTIVE AND OBJECTIVE BOX
NIMA PINON is a 52y Female s/p ROBOTIC ASSISTED LEFT TOTAL KNEE ARTHROPLASTY WITH KHADAR        denies any chest pain shortness of breath palpitation dizziness lightheadedness nausea vomiting fever or chills    T(C): 36.8 (01-19-23 @ 10:29), Max: 37.1 (01-18-23 @ 20:30)  HR: 82 (01-19-23 @ 10:01) (45 - 82)  BP: 125/80 (01-19-23 @ 10:01) (94/61 - 126/76)  RR: 16 (01-19-23 @ 04:15) (12 - 18)  SpO2: 95% (01-19-23 @ 10:01) (93% - 100%)  no jvd/bruit  s1 s2 rrr  cta  s/nt/nd  no calf tend                        11.1   13.57 )-----------( 293      ( 19 Jan 2023 05:30 )             35.9   01-19    139  |  107  |  11  ----------------------------<  113<H>  4.8   |  28  |  0.59    Ca    8.7      19 Jan 2023 05:30        cont dvt px  pain control  bowel regimen  antiemetics  incentive spirometer

## 2023-01-19 NOTE — PROGRESS NOTE ADULT - SUBJECTIVE AND OBJECTIVE BOX
Patient is seen and examined at bedside. Denies CP/SOB/Dizziness/N/V/D/HA. Pain is controlled.     Vital Signs Last 24 Hrs  T(C): 36.8 (19 Jan 2023 10:29), Max: 37.1 (18 Jan 2023 20:30)  T(F): 98.2 (19 Jan 2023 10:29), Max: 98.7 (18 Jan 2023 20:30)  HR: 82 (19 Jan 2023 10:01) (45 - 82)  BP: 125/80 (19 Jan 2023 10:01) (94/61 - 126/76)  BP(mean): --  RR: 16 (19 Jan 2023 04:15) (12 - 18)  SpO2: 95% (19 Jan 2023 10:01) (93% - 100%)    Parameters below as of 19 Jan 2023 10:01  Patient On (Oxygen Delivery Method): room air          PHYSICAL EXAM:  General: NAD  Neuro:  Alert & responsive  HEENT: NCAT, EOMI, conjunctiva clear  abd: soft, NT/ND  Right LE: Motor intact + EHL/FHL/TA/GS.  Sensation is grossly intact.  Extremity warm, compartments soft, compressible. No calf tenderness. DP 2+   Left LE: Prineo dressing C/D/I. Motor intact +EHL/FHL/TA/GS. Sensation is grossly intact. Extremity warm, compartments soft, compressible. No calf tenderness. DP2+    Labs:                          11.1   13.57 )-----------( 293      ( 19 Jan 2023 05:30 )             35.9       01-19    139  |  107  |  11  ----------------------------<  113<H>  4.8   |  28  |  0.59    Ca    8.7      19 Jan 2023 05:30        A/P: Patient is a 52y y/o Female s/p left TKA, POD # 1  -wound care, knee extension/leg elevation, cryocuff, isometric exercises, new medications reviewed with pt  -Pain control/analgesia discussed. Pt wants to have vicodin sent she does not tolerate oxycodone well.   -Inc spirometry reviewed with pt, demonstrated competence  -DVT prophylaxis with Venodynes/Aspirin 81mg BID  -PT/OT/WBAT  -medical consult reviewed   -DC planning: home today with home  care  -Pt seen in am by Dr Mendoza

## 2023-01-19 NOTE — DISCHARGE NOTE NURSING/CASE MANAGEMENT/SOCIAL WORK - NSDCPEFALRISK_GEN_ALL_CORE
For information on Fall & Injury Prevention, visit: [email or web address] OR  [email or web address] OR  https://www.cdc.gov/steadi/patient.html

## 2023-01-22 DIAGNOSIS — E78.5 HYPERLIPIDEMIA, UNSPECIFIED: ICD-10-CM

## 2023-01-22 DIAGNOSIS — M17.12 UNILATERAL PRIMARY OSTEOARTHRITIS, LEFT KNEE: ICD-10-CM

## 2023-01-22 DIAGNOSIS — K21.9 GASTRO-ESOPHAGEAL REFLUX DISEASE WITHOUT ESOPHAGITIS: ICD-10-CM

## 2023-01-22 DIAGNOSIS — E66.9 OBESITY, UNSPECIFIED: ICD-10-CM

## 2023-01-22 DIAGNOSIS — F32.A DEPRESSION, UNSPECIFIED: ICD-10-CM

## 2023-01-23 RX ORDER — CELECOXIB 200 MG/1
200 CAPSULE ORAL TWICE DAILY
Qty: 60 | Refills: 0 | Status: ACTIVE | COMMUNITY
Start: 2023-01-23 | End: 1900-01-01

## 2023-01-23 RX ORDER — HYDROCODONE BITARTRATE AND ACETAMINOPHEN 5; 300 MG/1; MG/1
5-300 TABLET ORAL EVERY 6 HOURS
Qty: 40 | Refills: 0 | Status: ACTIVE | COMMUNITY
Start: 2023-01-23 | End: 1900-01-01

## 2023-01-23 RX ORDER — PANTOPRAZOLE 40 MG/1
40 TABLET, DELAYED RELEASE ORAL DAILY
Qty: 30 | Refills: 0 | Status: ACTIVE | COMMUNITY
Start: 2023-01-23 | End: 1900-01-01

## 2023-01-31 ENCOUNTER — APPOINTMENT (OUTPATIENT)
Dept: ORTHOPEDIC SURGERY | Facility: CLINIC | Age: 53
End: 2023-01-31
Payer: OTHER MISCELLANEOUS

## 2023-01-31 VITALS — WEIGHT: 190 LBS | BODY MASS INDEX: 30.53 KG/M2 | HEIGHT: 66 IN

## 2023-01-31 PROCEDURE — 73562 X-RAY EXAM OF KNEE 3: CPT | Mod: LT

## 2023-01-31 PROCEDURE — 99024 POSTOP FOLLOW-UP VISIT: CPT

## 2023-01-31 NOTE — HISTORY OF PRESENT ILLNESS
[] : Post Surgical Visit: yes [de-identified] : 1/31/23: 2 weeks s/p L TKA - She is overall doing well with some soreness. No fevers/chills. She has been making good progress with at home PT. \par \par Previous doc:\par ([facility] DOI 10/31/2008)\par Pt seeing Dr Pabon for years for her génesis knee injury at work - she is on disability now- she had a Rt TKA in 2016 - she is having pain with this has cont swelling sn has difficulty bending her knee - pain standing and kneeling- always has swelling in the knee- her left knee has pain with OA has had inj and cont to have pain - new rt knee clicking as well - was never sig improved after surgery -- stairs are very difficulty =she feels overall the rt knee is worse right\par 4/16/19: F/U MRI left knee. Had blood work done. Both knees still hurt.\par 6/10/19: No change - cont pain in both knees. No auth yet for HA inj left knee or brace right knee.\par 11/24/20: Progressively worsening pain of both knees. Previously prescribed mobic did not give her relief. \par 12/18/20: F/U MRI both knees. Cortisone inj left knee helped for 3 days.\par 1/26/21: Orthovisc #1 left knee.\par 2/2/21: Orthovisc #2 left knee.\par 2/9/21: Orthovisc #3 left knee.\par 2/22/21: orthovisc #4 left knee.\par 11/23/21: f/up for bilateral knees. she was seen by Dr. Ryan on 10/11/21 as she was having increasing pain. She was given a CSI for the L knee which was beneficial for 2 days. She is complaining of increasing pain and loss of ROM in the left knee. She is also having medial right knee pain. She has not yet been approved for PT.\par 12/13/21: F/u review CT results.\par 1/21/22: continued and worsening pain in the left knee. She was recently told she needs spinal surgery and would like to continue with PT for now.\par 4/11/22: Continued and worsening pain in b/l knees. Has not been able to attend PT due to authorization issues. Prev inj provided mild relief. \par 6/13/22: continued and worsening pain in b/l knees- currently, the right is worse than the left. She had delayed PT course due to auth issues\par 7/11/22: No change, since last visit.  Has not had any PT auth yet.  States there were no records received by  at her last hearing.\par 8/22/22: No change since last visit - just got auth for PT and is scheduled to start this week.\par 9/26/22: Issue with auth for left TKA surgery last month was cancelled.  Worsening pain, currently in PT.\par 10/10/22: Some relief from PT with ROM and strnth but continues to have pain daily - has to use a cane for ambuluation \par 11/14/22: Continued and worsening pain in b/l knees. She is scheduled for surgery in the new year. Having trouble with ADL's. Has been doing HEP. [de-identified] : 1/18/23 [de-identified] : NESTOR RUBALCAVA

## 2023-01-31 NOTE — IMAGING
[de-identified] : Effected side: left \par Incision is clean and dry with no drainage - dressing removed -\par Sensation intact\par +1 edema LE\par Decreased ROM: 3-95\par \par Xray 3 views knee - Implants good position and well fixed - no fracture or dislocation\par

## 2023-01-31 NOTE — DISCUSSION/SUMMARY
[de-identified] : The incision was inspected and was clean and dry with no drainage.  The patient was instructed to call for fevers, chills, wound drainage, wound opening, redness, or any other concerns.\par \par The patient is doing well at this time. The patient will be started on a course of physical therapy. I recommended that the patient works on range of motion at home and was shown how to do this. I encouraged the patient to increase ambulation. The patient can continue to take Tylenol for occasional discomfort. The patient was advised not to do any dental work for the first three months following the surgery. We will see the patient  back for a follow-up for a repeat evaluation. The patient  will call or return earlier for any questions or concerns.  Signs and symptoms of infection reviewed and patient advised to call immediately for redness, fevers, and/or chills.\par \par Entered by Natalia Steel acting as scribe.\par

## 2023-02-08 PROBLEM — F32.A DEPRESSION, UNSPECIFIED: Chronic | Status: ACTIVE | Noted: 2022-12-30

## 2023-02-08 PROBLEM — E66.9 OBESITY, UNSPECIFIED: Chronic | Status: ACTIVE | Noted: 2022-12-30

## 2023-02-08 PROBLEM — K21.9 GASTRO-ESOPHAGEAL REFLUX DISEASE WITHOUT ESOPHAGITIS: Chronic | Status: ACTIVE | Noted: 2022-12-30

## 2023-02-08 PROBLEM — E78.5 HYPERLIPIDEMIA, UNSPECIFIED: Chronic | Status: ACTIVE | Noted: 2022-12-30

## 2023-02-20 NOTE — PHYSICAL THERAPY INITIAL EVALUATION ADULT - LEVEL OF INDEPENDENCE: STAIR NEGOTIATION, REHAB EVAL
02/20/23 1641   Child Life   Location ED  (CC: facial laceration)   Intervention Family Support;Supportive Check In;Procedure Support;Preparation    CCLS introduced self and services to patient, mom, and dad. Patient was sitting in bed and was very social with CCLS. Patient needed cleaning and gluing of facial laceration. Patient easily engaged in medical play preparation for cleaning with water squirter and asked questions about the glue which CCLS provided developmentally appropriate answers.     During procedure patient laid independently in bed with mom and dad standing beside. Patient chose to watch charlette mouse video on tablet. Patient coped well remaining at baseline throughout. No additional CFL needs identified prior to discharge.    Anxiety Low Anxiety   Techniques to [place] with Loss/Stress/Change diversional activity;family presence   Able to Shift Focus From Anxiety Easy       
contact guard

## 2023-02-21 ENCOUNTER — APPOINTMENT (OUTPATIENT)
Dept: ORTHOPEDIC SURGERY | Facility: CLINIC | Age: 53
End: 2023-02-21
Payer: OTHER MISCELLANEOUS

## 2023-02-21 PROCEDURE — 99072 ADDL SUPL MATRL&STAF TM PHE: CPT

## 2023-02-21 PROCEDURE — 99215 OFFICE O/P EST HI 40 MIN: CPT | Mod: 24

## 2023-02-27 ENCOUNTER — APPOINTMENT (OUTPATIENT)
Dept: ORTHOPEDIC SURGERY | Facility: CLINIC | Age: 53
End: 2023-02-27
Payer: OTHER MISCELLANEOUS

## 2023-02-27 ENCOUNTER — RESULT CHARGE (OUTPATIENT)
Age: 53
End: 2023-02-27

## 2023-02-27 VITALS — WEIGHT: 190 LBS | BODY MASS INDEX: 30.53 KG/M2 | HEIGHT: 66 IN

## 2023-02-27 PROCEDURE — 99072 ADDL SUPL MATRL&STAF TM PHE: CPT

## 2023-02-27 PROCEDURE — 99024 POSTOP FOLLOW-UP VISIT: CPT

## 2023-02-27 PROCEDURE — 73562 X-RAY EXAM OF KNEE 3: CPT | Mod: LT

## 2023-02-27 NOTE — HISTORY OF PRESENT ILLNESS
[Neck] : neck [Lower back] : lower back [Constant] : constant [Not working due to injury] : Work status: not working due to injury [10] : 10 [Tingling] : tingling [de-identified] :  DOI:10/31/2008;  DOI: 10/24/14\par 10/31/2008- Reports immediate neck pain and pain that radiated down the RUE\par 10/24/14- Reports return of above symptoms, after lifting at her job\par Cspine 4v- straight, DDD most notable C5-7, facet arthropathy\par \par MRI C spine 1/6/22 [facility]: 1. Multilevel degenerative disc disease and spondylosis, mild to moderate in severity at C5-6 and C6-7.\par 2. Mild upper thoracic levoscoliosis with minimal dextroconvex cervical spinal curvature. Straightening of cervical lordosis with grade 1 anterolisthesis of C7 on T1.\par 3. Left eccentric disc bulge at C3-4 resulting in moderate left lateral recess and foraminal stenosis.\par 4. Left eccentric disc bulge at C4-5 with small broad-based right central herniation resulting in mild left foraminal stenosis.\par 5. Bulging disc and broad-based central herniation at C5-6 mildly impinging upon the ventral spinal cord and resulting in mild central canal and moderate to marked bilateral foraminal stenosis.\par 6. Bulging disc at C6-7 resulting in moderate right and moderate to marked left foraminal stenosis.\par 7. Pseudodisc bulge at C7-T1 with bilateral facet joint hypertrophy resulting in moderate to marked left and moderate right foraminal stenosis.\par 8. Broad-based central disc herniations partially visualized at T2-3 and T3-4 with minimal impingement upon the ventral spinal cord. \par Ind. review- \par C4/5 bulge with mild b/l NF narrowing; \par C5/6 bulge with HNP and b/l foraminal narrowing; \par C6/7 bulge with b/l foraminal narrowing;\par C7/T1 subtle anterolisthesis w/o clear NF stenosis\par \par EMG BUE 1/13/22- Mild L CTS\par \par -------------------------------------\par PMH: HLD; PSH: hernia, finger tendon repair, rTKA\par SH: intermittent tobacco, occ. etoh, denies drugs. Not working. \par \par 12/17/21- Ms. Tami Mg, a 51-year-old female, presents today for neck pain. She fell off a 12 foot ladder 10/31/2008. Pain is right in the back of the neck. Radiates down RUE with numbness into all fingers. Symptoms are worse with activity, wakes up at night with numbness. She is OOW, disabled. She was working as a  on the floor at [employer] at the time of injury. \par 1/10/22- MRI f/u. Still neck pain with pain radiating down the BUE through scapulae, arms, forearms; a/w headaches as well\par 2/23/22- continued neck pain radiating down BUE through scapulae\par 4/6/22- Having severe lower back pain; states her back went out when putting her pants on. PT has been helping with neck pain. Had an episode of R hand tremors with associated numbness in entire R hand and R anterior forearm a few days ago.\par 6/1/22- Neck and low back pain is worse since last visit. Gabapentin with mild relief. Numbness in bilateral UEs localized both dorsal and volar forearms, is somewhat worse when sleeping from elbows to all fingers bilaterally.  Physical therapy was denied. \par 7/13/22- Still neck pain radiating down the BUE through scapulae, arms, dorsal forearms. \par 2/21/23- s/p L TKA one month ago. Still neck pain radiating through the BUE to the scapulae, arms, dorsal forearms, RUE>LUE [] : Post Surgical Visit: no [FreeTextEntry1] : neck and back  [FreeTextEntry5] : pt cannot lift her right arm and states she cannot sleep because of the pain when she puts pressure on the arm. [FreeTextEntry6] : numbness  [FreeTextEntry7] : into the hands

## 2023-02-27 NOTE — HISTORY OF PRESENT ILLNESS
[8] : 8 [7] : 7 [Dull/Aching] : dull/aching [] : yes [de-identified] : 2/27/23: 6 weeks s/p L TKA - She is overall doing well with pain about 7-8/10. She has been making good progress with PT. She has some pain in the left shin/ankle\par \par Previous doc:\par ([facility] DOI 10/31/2008)\par Pt seeing Dr Pabon for years for her génesis knee injury at work - she is on disability now- she had a Rt TKA in 2016 - she is having pain with this has cont swelling sn has difficulty bending her knee - pain standing and kneeling- always has swelling in the knee- her left knee has pain with OA has had inj and cont to have pain - new rt knee clicking as well - was never sig improved after surgery -- stairs are very difficulty =she feels overall the rt knee is worse right\par 4/16/19: F/U MRI left knee. Had blood work done. Both knees still hurt.\par 6/10/19: No change - cont pain in both knees. No auth yet for HA inj left knee or brace right knee.\par 11/24/20: Progressively worsening pain of both knees. Previously prescribed mobic did not give her relief. \par 12/18/20: F/U MRI both knees. Cortisone inj left knee helped for 3 days.\par 1/26/21: Orthovisc #1 left knee.\par 2/2/21: Orthovisc #2 left knee.\par 2/9/21: Orthovisc #3 left knee.\par 2/22/21: orthovisc #4 left knee.\par 11/23/21: f/up for bilateral knees. she was seen by Dr. Ryan on 10/11/21 as she was having increasing pain. She was given a CSI for the L knee which was beneficial for 2 days. She is complaining of increasing pain and loss of ROM in the left knee. She is also having medial right knee pain. She has not yet been approved for PT.\par 12/13/21: F/u review CT results.\par 1/21/22: continued and worsening pain in the left knee. She was recently told she needs spinal surgery and would like to continue with PT for now.\par 4/11/22: Continued and worsening pain in b/l knees. Has not been able to attend PT due to authorization issues. Prev inj provided mild relief. \par 6/13/22: continued and worsening pain in b/l knees- currently, the right is worse than the left. She had delayed PT course due to auth issues\par 7/11/22: No change, since last visit.  Has not had any PT auth yet.  States there were no records received by  at her last hearing.\par 8/22/22: No change since last visit - just got auth for PT and is scheduled to start this week.\par 9/26/22: Issue with auth for left TKA surgery last month was cancelled.  Worsening pain, currently in PT.\par 10/10/22: Some relief from PT with ROM and strnth but continues to have pain daily - has to use a cane for ambuluation \par 11/14/22: Continued and worsening pain in b/l knees. She is scheduled for surgery in the new year. Having trouble with ADL's. Has been doing HEP.\par 1/31/23: 2 weeks s/p L TKA - She is overall doing well with some soreness. No fevers/chills. She has been making good progress with at home PT.  [FreeTextEntry5] : Pt is here for second post op visit s/p L TKA KHADAR. Pain has improved since the last visit. Pt is attending PT 2x weekly with some relief.  [FreeTextEntry7] : to left shin [de-identified] : PT [de-identified] : 1/18/23 [de-identified] : NESTOR RUBALCAVA

## 2023-02-27 NOTE — DISCUSSION/SUMMARY
[de-identified] : The incision was inspected and was clean and dry with no drainage.  The patient was instructed to call for fevers, chills, wound drainage, wound opening, redness, or any other concerns.\par \par The patient is doing well at this time. The patient will be started on a course of physical therapy. I recommended that the patient works on range of motion at home and was shown how to do this. I encouraged the patient to increase ambulation. The patient can continue to take Tylenol for occasional discomfort. The patient was advised not to do any dental work for the first three months following the surgery. We will see the patient  back for a follow-up for a repeat evaluation. The patient  will call or return earlier for any questions or concerns.  Signs and symptoms of infection reviewed and patient advised to call immediately for redness, fevers, and/or chills.\par \par Entered by Natalia Steel acting as scribe.\par

## 2023-02-27 NOTE — IMAGING
[de-identified] : CSPINE\par Inspection: No rash or ecchymosis\par Palpation: No spasm in traps, rhomboids, paracervicals; TTP R trap and rhomboid\par ROM: Full with stiffness\par Strength: 5/5 bilateral deltoid, biceps, triceps, wrist flexors, wrist extensors, , abductors\par Sensation: Sensation present to light touch bilateral C5-T1 distributions\par Neurological testing for the cervical spine is as follows: positive left Spurling test, positive Hernández reflex on left and positive Hernández reflex on right. \par \par +b/l carpal compression testing. -Tinels b/l cubital tunnels and neg hyperflexion tests at elbows\par \par FORMAL REQUEST AUTHORIZATION FOR Spine surgery

## 2023-02-27 NOTE — IMAGING
[de-identified] : Effected side: left \par Incision well healed\par Sensation intact\par +1 edema LE\par Decreased ROM: 0-120\par \par Xray 3 views knee - Implants good position and well fixed - no fracture or dislocation\par

## 2023-02-27 NOTE — WORK
[Sprain/Strain] : sprain/strain [Other: ___] : [unfilled] [Was the competent medical cause of the injury] : was the competent medical cause of the injury [Are consistent with the injury] : are consistent with the injury [Total] : total [Reveals pre-existing condition(s) that may affect treatment/prognosis] : reveals pre-existing condition(s) that may affect treatment/prognosis [FreeTextEntry2] : Other WC injury with cervical radicular complaints

## 2023-02-27 NOTE — ASSESSMENT
[FreeTextEntry1] : Given that she never had neck pain with pain radiating down the BUE prior to the described incidentl, my opinion is that the injury is causally related to her complaints. \par PT has been discontinued, was seeing some benefit\par Carpal tunnel splints QHS. \par Patient has undergone extensive conservative measures including PT, meds for years with persistent upper extremity radiculopathy. \par Discussed continued conservative measures including PT, meds, Delmi, as well as operative interventions. \par Patient interested in more definitive interventions. \par \par MRI 1/26/22 shows:\par C4/5 bulge with mild b/l NF narrowing; \par C5/6 bulge with HNP and b/l foraminal narrowing; \par C6/7 bulge with b/l foraminal narrowing;\par C7/T1 subtle anterolisthesis w/o clear NF stenosis \par \par Indicating for ACDF C5/6, C6/7\par \par Anterior Cervical Discectomy and Fusion- We've discussed the surgery details including instrumentation and grafting options (local, allograft, ICBG, and biologics) as well as potential for complications including but not limited to pain, scar, bleeding, and infection. There is also a possibility for hardware complication such as malposition of hardware, hardware loosening, pullout, failure or fracture of bone, adjacent segment disease, pseudarthrosis, and need for future surgery. Finally, we discussed potential for injury to nerves, the spinal cord either transient or permanent, CSF leak, damage to blood vessels, paralysis, blindness, stroke, dysphagia, dysphonia, need for transfusion, and medical complications.  The patient verbalized understanding and all questions were answered. \par \par Will send for updated MRI for surgical planning, as her MRI is now one year old\par \par Gabapentin- Patient advised of sedating effects, instructed not to drive, operate machinery, or take with other sedating medications. Advised of need to taper on/off medication and risk of abruptly stopping gabapentin. \par

## 2023-02-27 NOTE — ASSESSMENT
[FreeTextEntry1] : Rt Total knee- painful ? mild mid flex instability otherwise looks good - we will get blood work and try a brace to see if that helps - her left knee only has mild OA so I do not see benefit for TKA at this point - for now we will focus on rt as that is the more painful knee\par 4/16/19: MRI showing lateral and PF OA, no meniscus tear seen. Blood work neg. She is not a candidate for arthroscopy and not enough damage yet for left TKA. Long time discussion regarding outcomes of revision for right knee but for now will just try orthovisc left knee.\par 6/10/19: Auth pending for HA inj left knee and HKB right knee. For now will try PT and Mobic.\par 11/24/20: Continued pain the right knee at the proximal aspect of patella and quad insertion, feels that she has small defect there. Will get MRI of the right knee to look at quad tendon and patella component. Left knee has moderate OA that has progressively gotten worse. Will try injection today. Will also repeat MRI of the left knee to look for progression of her previous injury and to evaluate fibular head lesion to confirm no change.\par 12/18/20: Short term relief from left knee inj - MRI with sig PF OA, recc HA injections. 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 Start PT for both knees.\par 11/23/21: R TKA hardware in place; she continues to have R knee pain. Increased left knee pain and loss of ROM. Discussed pain medication vs. L TKA. She has done CSI and HA injections. Most recent CSI 10/11/21 provided minimal relief. Advised her pain may be idiopathic. Patient would like to proceed with L TKA. 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 Planning to proceed with L TKA. Will send to oncologist to confirm nothing needs to be done for fibular head at time of TKA. waiting for Auth for TKA\par 1/21/22: Evaluation of the lesion is begin as per the oncologist. She is planning for TKA after her family issues resolve. She also is being treated for her cspine. Treated with CSI today to alleviate symptoms in the mean time\par 4/11/22: overall right TKA still has significant pain and complex postop course due to lack of PT. Will continue to monitor this.  She has significant OA left knee and is preparing for TKA once she feels she is able to and will continue PT on b/l knees. - Discussed not a candidate for Rev TKA at this time with unknow cause of pain  \par 6/13/22: b/l knee still continued pain, cont PT for both knee building strength plan for L TKA benji assisted waiting for auth- I am conc right knee still gives her pain w/o obbvi reason. Continue to work this up for underlying cause. CT scan showed bone cyst was told it was benign. \par 7/11/22: No change - still with pain in both knees.  Again recc PT to work on strengthening and reeval after 4-6 weeks of treatment.\par 8/22/22: Starting PT this week for both knees and will then reeval.  Still painful right TKA if no improvement will get further workup while we await auth for left TKA.\par 9/26/22: Left knee inj today for acute relief, cont PT and reeval in 6 weeks.  I still feel we should proceed with TKA but will try some more conservative treatments for now while we wait for auth.\par 10/10/22: Continues to have pain and will likely need surgery in the new year. Will continue PT for now and FU in 4 weeks. \par 11/14/22: She is planning for L TKA in the new year. She continues to have pain in the right knee but will re-eval this pain after L TKA and discuss options. At this point, I see no obvious signs for pain in her right TKA at this time and discussed this may be her body's reaction to knee replacements.She understands this and is aware she may feel similar with her left knee but she feels she cannot delay surgery as she cont to loose funcition in the knee. Risks and benefits again discussed and all questions answered. CT reviewed: 2 degrees valgus femur and 3 degrees varus tibia. \par 1/31/23: 2 weeks s/p L TKA - She is overall doing well with some soreness, minimal swelling. ROM 3-95 today, advised to work on extension. She will start PT/HEP and follow up in 4 weeks. \par \par 2/27/23: 6 weeks s/p L TKA - She is doing well with full ROM and excellent function. She will continue with PT and begin to transition to HEP. Follow up in 4 weeks. She will also start PT on the right knee as well. she hs 8/10 pain but she has great function and is very happy

## 2023-03-23 ENCOUNTER — FORM ENCOUNTER (OUTPATIENT)
Age: 53
End: 2023-03-23

## 2023-03-27 ENCOUNTER — APPOINTMENT (OUTPATIENT)
Dept: ORTHOPEDIC SURGERY | Facility: CLINIC | Age: 53
End: 2023-03-27
Payer: OTHER MISCELLANEOUS

## 2023-03-27 VITALS — HEIGHT: 66 IN | BODY MASS INDEX: 30.53 KG/M2 | WEIGHT: 190 LBS

## 2023-03-27 DIAGNOSIS — M17.12 UNILATERAL PRIMARY OSTEOARTHRITIS, LEFT KNEE: ICD-10-CM

## 2023-03-27 PROCEDURE — 99024 POSTOP FOLLOW-UP VISIT: CPT

## 2023-03-27 NOTE — WORK
[Does not reveal pre-existing condition(s) that may affect treatment/prognosis] : does not reveal pre-existing condition(s) that may affect treatment/prognosis [Cannot return to work because ________] : cannot return to work because [unfilled] [Unknown at this time] : : unknown at this time [Patient] : patient [No Rx restrictions] : No Rx restrictions. [I provided the services listed above] :  I provided the services listed above.

## 2023-03-27 NOTE — DISCUSSION/SUMMARY
[de-identified] : The patient was advised of the diagnosis.  The natural history of the pathology was explained in full to the patient in layman's terms. All questions were answered.  The risks and benefits of surgical and non-surgical treatment alternatives were explained in full to the patient.\par \par Entered by Mateus Christy PA-C working as a scribe for Dr. Mendoza.\par 
[employer]

## 2023-03-27 NOTE — HISTORY OF PRESENT ILLNESS
[8] : 8 [0] : 0 [Dull/Aching] : dull/aching [de-identified] : 3/27/23: 10 weeks postop, min pain.  PT helps.\par \par Previous doc:\par ([facility] DOI 10/31/2008)\par Pt seeing Dr Pabon for years for her génesis knee injury at work - she is on disability now- she had a Rt TKA in 2016 - she is having pain with this has cont swelling sn has difficulty bending her knee - pain standing and kneeling- always has swelling in the knee- her left knee has pain with OA has had inj and cont to have pain - new rt knee clicking as well - was never sig improved after surgery -- stairs are very difficulty =she feels overall the rt knee is worse right\par 4/16/19: F/U MRI left knee. Had blood work done. Both knees still hurt.\par 6/10/19: No change - cont pain in both knees. No auth yet for HA inj left knee or brace right knee.\par 11/24/20: Progressively worsening pain of both knees. Previously prescribed mobic did not give her relief. \par 12/18/20: F/U MRI both knees. Cortisone inj left knee helped for 3 days.\par 1/26/21: Orthovisc #1 left knee.\par 2/2/21: Orthovisc #2 left knee.\par 2/9/21: Orthovisc #3 left knee.\par 2/22/21: orthovisc #4 left knee.\par 11/23/21: f/up for bilateral knees. she was seen by Dr. Ryan on 10/11/21 as she was having increasing pain. She was given a CSI for the L knee which was beneficial for 2 days. She is complaining of increasing pain and loss of ROM in the left knee. She is also having medial right knee pain. She has not yet been approved for PT.\par 12/13/21: F/u review CT results.\par 1/21/22: continued and worsening pain in the left knee. She was recently told she needs spinal surgery and would like to continue with PT for now.\par 4/11/22: Continued and worsening pain in b/l knees. Has not been able to attend PT due to authorization issues. Prev inj provided mild relief. \par 6/13/22: continued and worsening pain in b/l knees- currently, the right is worse than the left. She had delayed PT course due to auth issues\par 7/11/22: No change, since last visit.  Has not had any PT auth yet.  States there were no records received by  at her last hearing.\par 8/22/22: No change since last visit - just got auth for PT and is scheduled to start this week.\par 9/26/22: Issue with auth for left TKA surgery last month was cancelled.  Worsening pain, currently in PT.\par 10/10/22: Some relief from PT with ROM and strnth but continues to have pain daily - has to use a cane for ambuluation \par 11/14/22: Continued and worsening pain in b/l knees. She is scheduled for surgery in the new year. Having trouble with ADL's. Has been doing HEP.\par 1/31/23: 2 weeks s/p L TKA - She is overall doing well with some soreness. No fevers/chills. She has been making good progress with at home PT. \par 2/27/23: 6 weeks s/p L TKA - She is overall doing well with pain about 7-8/10. She has been making good progress with PT. She has some pain in the left shin/ankle [] : no [de-identified] : PT [de-identified] : 1/18/23 [de-identified] : NESTOR RUBALCAVA

## 2023-03-27 NOTE — ASSESSMENT
[FreeTextEntry1] : Previous doc:\par Rt Total knee- painful ? mild mid flex instability otherwise looks good - we will get blood work and try a brace to see if that helps - her left knee only has mild OA so I do not see benefit for TKA at this point - for now we will focus on rt as that is the more painful knee\par 4/16/19: MRI showing lateral and PF OA, no meniscus tear seen. Blood work neg. She is not a candidate for arthroscopy and not enough damage yet for left TKA. Long time discussion regarding outcomes of revision for right knee but for now will just try orthovisc left knee.\par 6/10/19: Auth pending for HA inj left knee and HKB right knee. For now will try PT and Mobic.\par 11/24/20: Continued pain the right knee at the proximal aspect of patella and quad insertion, feels that she has small defect there. Will get MRI of the right knee to look at quad tendon and patella component. Left knee has moderate OA that has progressively gotten worse. Will try injection today. Will also repeat MRI of the left knee to look for progression of her previous injury and to evaluate fibular head lesion to confirm no change.\par 12/18/20: Short term relief from left knee inj - MRI with sig PF OA, recc HA injections. 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 Start PT for both knees.\par 11/23/21: R TKA hardware in place; she continues to have R knee pain. Increased left knee pain and loss of ROM. Discussed pain medication vs. L TKA. She has done CSI and HA injections. Most recent CSI 10/11/21 provided minimal relief. Advised her pain may be idiopathic. Patient would like to proceed with L TKA. 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 Planning to proceed with L TKA. Will send to oncologist to confirm nothing needs to be done for fibular head at time of TKA. waiting for Auth for TKA\par 1/21/22: Evaluation of the lesion is begin as per the oncologist. She is planning for TKA after her family issues resolve. She also is being treated for her cspine. Treated with CSI today to alleviate symptoms in the mean time\par 4/11/22: overall right TKA still has significant pain and complex postop course due to lack of PT. Will continue to monitor this.  She has significant OA left knee and is preparing for TKA once she feels she is able to and will continue PT on b/l knees. - Discussed not a candidate for Rev TKA at this time with unknow cause of pain  \par 6/13/22: b/l knee still continued pain, cont PT for both knee building strength plan for L TKA benji assisted waiting for auth- I am conc right knee still gives her pain w/o obbvi reason. Continue to work this up for underlying cause. CT scan showed bone cyst was told it was benign. \par 7/11/22: No change - still with pain in both knees.  Again recc PT to work on strengthening and reeval after 4-6 weeks of treatment.\par 8/22/22: Starting PT this week for both knees and will then reeval.  Still painful right TKA if no improvement will get further workup while we await auth for left TKA.\par 9/26/22: Left knee inj today for acute relief, cont PT and reeval in 6 weeks.  I still feel we should proceed with TKA but will try some more conservative treatments for now while we wait for auth.\par 10/10/22: Continues to have pain and will likely need surgery in the new year. Will continue PT for now and FU in 4 weeks. \par 11/14/22: She is planning for L TKA in the new year. She continues to have pain in the right knee but will re-eval this pain after L TKA and discuss options. At this point, I see no obvious signs for pain in her right TKA at this time and discussed this may be her body's reaction to knee replacements.She understands this and is aware she may feel similar with her left knee but she feels she cannot delay surgery as she cont to loose funcition in the knee. Risks and benefits again discussed and all questions answered. CT reviewed: 2 degrees valgus femur and 3 degrees varus tibia. \par 1/31/23: 2 weeks s/p L TKA - She is overall doing well with some soreness, minimal swelling. ROM 3-95 today, advised to work on extension. She will start PT/HEP and follow up in 4 weeks. \par 2/27/23: 6 weeks s/p L TKA - She is doing well with full ROM and excellent function. She will continue with PT and begin to transition to HEP. Follow up in 4 weeks. She will also start PT on the right knee as well. she hs 8/10 pain but she has great function and is very happy \par \par 3/27/23: 10 weeks postop doing very well, cont PT/HEP, abx for dentist, return at 1 year postop.

## 2023-05-31 ENCOUNTER — FORM ENCOUNTER (OUTPATIENT)
Age: 53
End: 2023-05-31

## 2023-06-01 ENCOUNTER — APPOINTMENT (OUTPATIENT)
Dept: MRI IMAGING | Facility: CLINIC | Age: 53
End: 2023-06-01
Payer: OTHER MISCELLANEOUS

## 2023-06-01 PROCEDURE — 72141 MRI NECK SPINE W/O DYE: CPT

## 2023-06-15 ENCOUNTER — FORM ENCOUNTER (OUTPATIENT)
Age: 53
End: 2023-06-15

## 2023-06-26 ENCOUNTER — APPOINTMENT (OUTPATIENT)
Dept: ORTHOPEDIC SURGERY | Facility: CLINIC | Age: 53
End: 2023-06-26
Payer: OTHER MISCELLANEOUS

## 2023-06-26 VITALS — BODY MASS INDEX: 30.53 KG/M2 | WEIGHT: 190 LBS | HEIGHT: 66 IN

## 2023-06-26 DIAGNOSIS — Z96.652 PRESENCE OF LEFT ARTIFICIAL KNEE JOINT: ICD-10-CM

## 2023-06-26 PROCEDURE — 99213 OFFICE O/P EST LOW 20 MIN: CPT

## 2023-06-26 NOTE — PHYSICAL EXAM
[de-identified] : Left knee: Inc healed.  No effusion.  ROM 0-120.  Lig stable.  NVI.  Walks without assistance.

## 2023-06-26 NOTE — DISCUSSION/SUMMARY
[de-identified] : The patient was advised of the diagnosis.  The natural history of the pathology was explained in full to the patient in layman's terms. All questions were answered.  The risks and benefits of surgical and non-surgical treatment alternatives were explained in full to the patient.\par \par Entered by Mateus Christy PA-C working as a scribe for Dr. Mendoza.\par

## 2023-06-26 NOTE — WORK
[Does not reveal pre-existing condition(s) that may affect treatment/prognosis] : does not reveal pre-existing condition(s) that may affect treatment/prognosis [Cannot return to work because ________] : cannot return to work because [unfilled] [Unknown at this time] : : unknown at this time [Patient] : patient [No Rx restrictions] : No Rx restrictions. [I provided the services listed above] :  I provided the services listed above. [Total (100%)] : total (100%)

## 2023-06-26 NOTE — ASSESSMENT
[FreeTextEntry1] : Previous doc:\par Rt Total knee- painful ? mild mid flex instability otherwise looks good - we will get blood work and try a brace to see if that helps - her left knee only has mild OA so I do not see benefit for TKA at this point - for now we will focus on rt as that is the more painful knee\par 4/16/19: MRI showing lateral and PF OA, no meniscus tear seen. Blood work neg. She is not a candidate for arthroscopy and not enough damage yet for left TKA. Long time discussion regarding outcomes of revision for right knee but for now will just try orthovisc left knee.\par 6/10/19: Auth pending for HA inj left knee and HKB right knee. For now will try PT and Mobic.\par 11/24/20: Continued pain the right knee at the proximal aspect of patella and quad insertion, feels that she has small defect there. Will get MRI of the right knee to look at quad tendon and patella component. Left knee has moderate OA that has progressively gotten worse. Will try injection today. Will also repeat MRI of the left knee to look for progression of her previous injury and to evaluate fibular head lesion to confirm no change.\par 12/18/20: Short term relief from left knee inj - MRI with sig PF OA, recc HA injections. 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 Start PT for both knees.\par 11/23/21: R TKA hardware in place; she continues to have R knee pain. Increased left knee pain and loss of ROM. Discussed pain medication vs. L TKA. She has done CSI and HA injections. Most recent CSI 10/11/21 provided minimal relief. Advised her pain may be idiopathic. Patient would like to proceed with L TKA. 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 Planning to proceed with L TKA. Will send to oncologist to confirm nothing needs to be done for fibular head at time of TKA. waiting for Auth for TKA\par 1/21/22: Evaluation of the lesion is begin as per the oncologist. She is planning for TKA after her family issues resolve. She also is being treated for her cspine. Treated with CSI today to alleviate symptoms in the mean time\par 4/11/22: overall right TKA still has significant pain and complex postop course due to lack of PT. Will continue to monitor this.  She has significant OA left knee and is preparing for TKA once she feels she is able to and will continue PT on b/l knees. - Discussed not a candidate for Rev TKA at this time with unknow cause of pain  \par 6/13/22: b/l knee still continued pain, cont PT for both knee building strength plan for L TKA benji assisted waiting for auth- I am conc right knee still gives her pain w/o obbvi reason. Continue to work this up for underlying cause. CT scan showed bone cyst was told it was benign. \par 7/11/22: No change - still with pain in both knees.  Again recc PT to work on strengthening and reeval after 4-6 weeks of treatment.\par 8/22/22: Starting PT this week for both knees and will then reeval.  Still painful right TKA if no improvement will get further workup while we await auth for left TKA.\par 9/26/22: Left knee inj today for acute relief, cont PT and reeval in 6 weeks.  I still feel we should proceed with TKA but will try some more conservative treatments for now while we wait for auth.\par 10/10/22: Continues to have pain and will likely need surgery in the new year. Will continue PT for now and FU in 4 weeks. \par 11/14/22: She is planning for L TKA in the new year. She continues to have pain in the right knee but will re-eval this pain after L TKA and discuss options. At this point, I see no obvious signs for pain in her right TKA at this time and discussed this may be her body's reaction to knee replacements.She understands this and is aware she may feel similar with her left knee but she feels she cannot delay surgery as she cont to loose funcition in the knee. Risks and benefits again discussed and all questions answered. CT reviewed: 2 degrees valgus femur and 3 degrees varus tibia. \par 1/31/23: 2 weeks s/p L TKA - She is overall doing well with some soreness, minimal swelling. ROM 3-95 today, advised to work on extension. She will start PT/HEP and follow up in 4 weeks. \par 2/27/23: 6 weeks s/p L TKA - She is doing well with full ROM and excellent function. She will continue with PT and begin to transition to HEP. Follow up in 4 weeks. She will also start PT on the right knee as well. she hs 8/10 pain but she has great function and is very happy \par 3/27/23: 10 weeks postop doing very well, cont PT/HEP, abx for dentist, return at 1 year postop.\par \par 6/26/23: Doing very well, cont PT.  HEP to work on quad strength, reeval in 3 months.

## 2023-06-26 NOTE — HISTORY OF PRESENT ILLNESS
[Dull/Aching] : dull/aching [5] : 5 [3] : 3 [de-identified] : 6/26/23: Min pain, stopped PT.  Doing HEP.\par \par Previous doc:\par (WC DOI 10/31/2008)\par Pt seeing Dr Pabon for years for her génesis knee injury at work - she is on disability now- she had a Rt TKA in 2016 - she is having pain with this has cont swelling sn has difficulty bending her knee - pain standing and kneeling- always has swelling in the knee- her left knee has pain with OA has had inj and cont to have pain - new rt knee clicking as well - was never sig improved after surgery -- stairs are very difficulty =she feels overall the rt knee is worse right\par 4/16/19: F/U MRI left knee. Had blood work done. Both knees still hurt.\par 6/10/19: No change - cont pain in both knees. No auth yet for HA inj left knee or brace right knee.\par 11/24/20: Progressively worsening pain of both knees. Previously prescribed mobic did not give her relief. \par 12/18/20: F/U MRI both knees. Cortisone inj left knee helped for 3 days.\par 1/26/21: Orthovisc #1 left knee.\par 2/2/21: Orthovisc #2 left knee.\par 2/9/21: Orthovisc #3 left knee.\par 2/22/21: orthovisc #4 left knee.\par 11/23/21: f/up for bilateral knees. she was seen by Dr. Ryan on 10/11/21 as she was having increasing pain. She was given a CSI for the L knee which was beneficial for 2 days. She is complaining of increasing pain and loss of ROM in the left knee. She is also having medial right knee pain. She has not yet been approved for PT.\par 12/13/21: F/u review CT results.\par 1/21/22: continued and worsening pain in the left knee. She was recently told she needs spinal surgery and would like to continue with PT for now.\par 4/11/22: Continued and worsening pain in b/l knees. Has not been able to attend PT due to authorization issues. Prev inj provided mild relief. \par 6/13/22: continued and worsening pain in b/l knees- currently, the right is worse than the left. She had delayed PT course due to auth issues\par 7/11/22: No change, since last visit.  Has not had any PT auth yet.  States there were no records received by  at her last hearing.\par 8/22/22: No change since last visit - just got auth for PT and is scheduled to start this week.\par 9/26/22: Issue with auth for left TKA surgery last month was cancelled.  Worsening pain, currently in PT.\par 10/10/22: Some relief from PT with ROM and strnth but continues to have pain daily - has to use a cane for ambuluation \par 11/14/22: Continued and worsening pain in b/l knees. She is scheduled for surgery in the new year. Having trouble with ADL's. Has been doing HEP.\par 1/31/23: 2 weeks s/p L TKA - She is overall doing well with some soreness. No fevers/chills. She has been making good progress with at home PT. \par 2/27/23: 6 weeks s/p L TKA - She is overall doing well with pain about 7-8/10. She has been making good progress with PT. She has some pain in the left shin/ankle\par 3/27/23: 10 weeks postop, min pain.  PT helps. [] : no [de-identified] : PT [de-identified] : 1/18/23 [de-identified] : NESTOR RUBALCAVA

## 2023-07-05 ENCOUNTER — APPOINTMENT (OUTPATIENT)
Dept: ORTHOPEDIC SURGERY | Facility: CLINIC | Age: 53
End: 2023-07-05

## 2023-09-06 ENCOUNTER — APPOINTMENT (OUTPATIENT)
Dept: ORTHOPEDIC SURGERY | Facility: CLINIC | Age: 53
End: 2023-09-06
Payer: OTHER MISCELLANEOUS

## 2023-09-06 VITALS — BODY MASS INDEX: 30.53 KG/M2 | HEIGHT: 66 IN | WEIGHT: 190 LBS

## 2023-09-06 PROCEDURE — 99215 OFFICE O/P EST HI 40 MIN: CPT

## 2023-09-06 NOTE — ASSESSMENT
[FreeTextEntry1] : Given that she never had neck pain with pain radiating down the BUE prior to the described incidentl, my opinion is that the injury is causally related to her complaints.  PT has been discontinued, was seeing some benefit in 2022.   PT HAS BEEN DISCONTINUED, SURGERY HAS BEEN DENIED DUE TO LACK OF RECENT PT. PT MUST BE APPROVED AT THIS POINT, AS WE ARE AT AN IMPASSE.   Carpal tunnel splints QHS.  Patient has undergone extensive conservative measures including PT, meds for years with persistent upper extremity radiculopathy.  Discussed continued conservative measures including PT, meds, Delmi, as well as operative interventions.  Patient interested in more definitive interventions.   MRI 6/1/23 shows: C4/5 bulge with worsenign b/l NF narrowing;  C5/6 bulge with HNP and b/l foraminal narrowing;  C6/7 bulge with b/l foraminal narrowing; C7/T1 subtle anterolisthesis w/o clear NF stenosis   Indicating for ACDF C4/5, C5/6, C6/7. Has worsening NF stenosis C4/5 on updated MRI.   Anterior Cervical Discectomy and Fusion- We've discussed the surgery details including instrumentation and grafting options (local, allograft, ICBG, and biologics) as well as potential for complications including but not limited to pain, scar, bleeding, and infection. There is also a possibility for hardware complication such as malposition of hardware, hardware loosening, pullout, failure or fracture of bone, adjacent segment disease, pseudarthrosis, and need for future surgery. Finally, we discussed potential for injury to nerves, the spinal cord either transient or permanent, CSF leak, damage to blood vessels, paralysis, blindness, stroke, dysphagia, dysphonia, need for transfusion, and medical complications.  The patient verbalized understanding and all questions were answered.   Gabapentin- Patient advised of sedating effects, instructed not to drive, operate machinery, or take with other sedating medications. Advised of need to taper on/off medication and risk of abruptly stopping gabapentin.

## 2023-09-06 NOTE — IMAGING
[de-identified] : CSPINE\par  Inspection: No rash or ecchymosis\par  Palpation: No spasm in traps, rhomboids, paracervicals; TTP R trap and rhomboid\par  ROM: Full with stiffness\par  Strength: 5/5 bilateral deltoid, biceps, triceps, wrist flexors, wrist extensors, , abductors\par  Sensation: Sensation present to light touch bilateral C5-T1 distributions\par  Neurological testing for the cervical spine is as follows: positive left Spurling test, positive Hernández reflex on left and positive Hernández reflex on right. \par  \par  +b/l carpal compression testing. -Tinels b/l cubital tunnels and neg hyperflexion tests at elbows\par  \par  FORMAL REQUEST AUTHORIZATION FOR Spine surgery

## 2023-09-06 NOTE — WORK
[Sprain/Strain] : sprain/strain [Other: ___] : [unfilled] [Was the competent medical cause of the injury] : was the competent medical cause of the injury [Are consistent with the injury] : are consistent with the injury [Reveals pre-existing condition(s) that may affect treatment/prognosis] : reveals pre-existing condition(s) that may affect treatment/prognosis [FreeTextEntry2] : Other WC injury with cervical radicular complaints

## 2023-09-06 NOTE — HISTORY OF PRESENT ILLNESS
[Neck] : neck [Lower back] : lower back [10] : 10 [Tingling] : tingling [Constant] : constant [Not working due to injury] : Work status: not working due to injury [de-identified] :  DOI:10/31/2008;  DOI: 10/24/14 10/31/2008- Reports immediate neck pain and pain that radiated down the RUE 10/24/14- Reports return of above symptoms, after lifting at her job Cspine 4v- straight, DDD most notable C5-7, facet arthropathy  MRI C spine 1/6/22 [facility]: 1. Multilevel degenerative disc disease and spondylosis, mild to moderate in severity at C5-6 and C6-7. 2. Mild upper thoracic levoscoliosis with minimal dextroconvex cervical spinal curvature. Straightening of cervical lordosis with grade 1 anterolisthesis of C7 on T1. 3. Left eccentric disc bulge at C3-4 resulting in moderate left lateral recess and foraminal stenosis. 4. Left eccentric disc bulge at C4-5 with small broad-based right central herniation resulting in mild left foraminal stenosis. 5. Bulging disc and broad-based central herniation at C5-6 mildly impinging upon the ventral spinal cord and resulting in mild central canal and moderate to marked bilateral foraminal stenosis. 6. Bulging disc at C6-7 resulting in moderate right and moderate to marked left foraminal stenosis. 7. Pseudodisc bulge at C7-T1 with bilateral facet joint hypertrophy resulting in moderate to marked left and moderate right foraminal stenosis. 8. Broad-based central disc herniations partially visualized at T2-3 and T3-4 with minimal impingement upon the ventral spinal cord.  Ind. review-  C4/5 bulge with mild b/l NF narrowing;  C5/6 bulge with HNP and b/l foraminal narrowing;  C6/7 bulge with b/l foraminal narrowing; C7/T1 subtle anterolisthesis w/o clear NF stenosis  6/1/23 Cervical MRI  - report noted in chart.  Ind. review- C4/5 bulge with b/l NF narrowing;  C5/6 bulge with HNP and b/l foraminal narrowing;  C6/7 bulge with b/l foraminal narrowing;  EMG BUE 1/13/22- Mild L CTS  ------------------------------------- PMH: HLD; PSH: hernia, finger tendon repair, rTKA SH: intermittent tobacco, occ. etoh, denies drugs. Not working.   12/17/21- Ms. Tami Mg, a 51-year-old female, presents today for neck pain. She fell off a 12 foot ladder 10/31/2008. Pain is right in the back of the neck. Radiates down RUE with numbness into all fingers. Symptoms are worse with activity, wakes up at night with numbness. She is OOW, disabled. She was working as a  on the floor at [employer] at the time of injury.  1/10/22- MRI f/u. Still neck pain with pain radiating down the BUE through scapulae, arms, forearms; a/w headaches as well 2/23/22- continued neck pain radiating down BUE through scapulae 4/6/22- Having severe lower back pain; states her back went out when putting her pants on. PT has been helping with neck pain. Had an episode of R hand tremors with associated numbness in entire R hand and R anterior forearm a few days ago. 6/1/22- Neck and low back pain is worse since last visit. Gabapentin with mild relief. Numbness in bilateral UEs localized both dorsal and volar forearms, is somewhat worse when sleeping from elbows to all fingers bilaterally.  Physical therapy was denied.  7/13/22- Still neck pain radiating down the BUE through scapulae, arms, dorsal forearms.  2/21/23- s/p L TKA one month ago. Still neck pain radiating through the BUE to the scapulae, arms, dorsal forearms, RUE>LUE 9/6/23- still RUE>LUE radic [] : Post Surgical Visit: no [FreeTextEntry1] : neck and back  [FreeTextEntry5] : pt cannot lift her right arm and states she cannot sleep because of the pain when she puts pressure on the arm. [FreeTextEntry6] : numbness  [FreeTextEntry7] : into the hands

## 2023-10-17 ENCOUNTER — APPOINTMENT (OUTPATIENT)
Dept: ORTHOPEDIC SURGERY | Facility: CLINIC | Age: 53
End: 2023-10-17
Payer: OTHER MISCELLANEOUS

## 2023-10-17 PROCEDURE — 99213 OFFICE O/P EST LOW 20 MIN: CPT

## 2023-12-05 ENCOUNTER — APPOINTMENT (OUTPATIENT)
Dept: ORTHOPEDIC SURGERY | Facility: CLINIC | Age: 53
End: 2023-12-05

## 2024-01-02 ENCOUNTER — APPOINTMENT (OUTPATIENT)
Dept: ORTHOPEDIC SURGERY | Facility: CLINIC | Age: 54
End: 2024-01-02
Payer: OTHER MISCELLANEOUS

## 2024-01-02 VITALS — HEIGHT: 66 IN | WEIGHT: 162 LBS | BODY MASS INDEX: 26.03 KG/M2

## 2024-01-02 PROCEDURE — 99214 OFFICE O/P EST MOD 30 MIN: CPT

## 2024-01-02 RX ORDER — CYCLOBENZAPRINE HYDROCHLORIDE 5 MG/1
5 TABLET, FILM COATED ORAL
Qty: 90 | Refills: 0 | Status: ACTIVE | COMMUNITY
Start: 2022-04-06 | End: 1900-01-01

## 2024-01-02 NOTE — HISTORY OF PRESENT ILLNESS
[Neck] : neck [Lower back] : lower back [10] : 10 [Tingling] : tingling [Constant] : constant [Not working due to injury] : Work status: not working due to injury [de-identified] :  DOI:10/31/2008;  DOI: 10/24/14 10/31/2008- Reports immediate neck pain and pain that radiated down the RUE 10/24/14- Reports return of above symptoms, after lifting at her job Cspine 4v- straight, DDD most notable C5-7, facet arthropathy  MRI C spine 1/6/22 [facility]: 1. Multilevel degenerative disc disease and spondylosis, mild to moderate in severity at C5-6 and C6-7. 2. Mild upper thoracic levoscoliosis with minimal dextroconvex cervical spinal curvature. Straightening of cervical lordosis with grade 1 anterolisthesis of C7 on T1. 3. Left eccentric disc bulge at C3-4 resulting in moderate left lateral recess and foraminal stenosis. 4. Left eccentric disc bulge at C4-5 with small broad-based right central herniation resulting in mild left foraminal stenosis. 5. Bulging disc and broad-based central herniation at C5-6 mildly impinging upon the ventral spinal cord and resulting in mild central canal and moderate to marked bilateral foraminal stenosis. 6. Bulging disc at C6-7 resulting in moderate right and moderate to marked left foraminal stenosis. 7. Pseudodisc bulge at C7-T1 with bilateral facet joint hypertrophy resulting in moderate to marked left and moderate right foraminal stenosis. 8. Broad-based central disc herniations partially visualized at T2-3 and T3-4 with minimal impingement upon the ventral spinal cord.  Ind. review-  C4/5 bulge with mild b/l NF narrowing;  C5/6 bulge with HNP and b/l foraminal narrowing;  C6/7 bulge with b/l foraminal narrowing; C7/T1 subtle anterolisthesis w/o clear NF stenosis  6/1/23 Cervical MRI  - report noted in chart.  Ind. review- C4/5 bulge with b/l NF narrowing;  C5/6 bulge with HNP and b/l foraminal narrowing;  C6/7 bulge with b/l foraminal narrowing;  EMG BUE 1/13/22- Mild L CTS  ------------------------------------- PMH: HLD; PSH: hernia, finger tendon repair, rTKA SH: intermittent tobacco, occ. etoh, denies drugs. Not working.   12/17/21- Ms. Tami Mg, a 51-year-old female, presents today for neck pain. She fell off a 12 foot ladder 10/31/2008. Pain is right in the back of the neck. Radiates down RUE with numbness into all fingers. Symptoms are worse with activity, wakes up at night with numbness. She is OOW, disabled. She was working as a  on the floor at [employer] at the time of injury.  1/10/22- MRI f/u. Still neck pain with pain radiating down the BUE through scapulae, arms, forearms; a/w headaches as well 2/23/22- continued neck pain radiating down BUE through scapulae 4/6/22- Having severe lower back pain; states her back went out when putting her pants on. PT has been helping with neck pain. Had an episode of R hand tremors with associated numbness in entire R hand and R anterior forearm a few days ago. 6/1/22- Neck and low back pain is worse since last visit. Gabapentin with mild relief. Numbness in bilateral UEs localized both dorsal and volar forearms, is somewhat worse when sleeping from elbows to all fingers bilaterally.  Physical therapy was denied.  7/13/22- Still neck pain radiating down the BUE through scapulae, arms, dorsal forearms.  2/21/23- s/p L TKA one month ago. Still neck pain radiating through the BUE to the scapulae, arms, dorsal forearms, RUE>LUE 9/6/23- still RUE>LUE radic 10/17/23- sxs same 1/2/24-sxs same. Continuing to wait for approval for surgery.  [] : Post Surgical Visit: no [FreeTextEntry1] : neck and back  [FreeTextEntry5] : pt cannot lift her right arm and states she cannot sleep because of the pain when she puts pressure on the arm. [FreeTextEntry6] : numbness  [FreeTextEntry7] : into the hands

## 2024-01-02 NOTE — IMAGING
[de-identified] : CSPINE\par  Inspection: No rash or ecchymosis\par  Palpation: No spasm in traps, rhomboids, paracervicals; TTP R trap and rhomboid\par  ROM: Full with stiffness\par  Strength: 5/5 bilateral deltoid, biceps, triceps, wrist flexors, wrist extensors, , abductors\par  Sensation: Sensation present to light touch bilateral C5-T1 distributions\par  Neurological testing for the cervical spine is as follows: positive left Spurling test, positive Hernández reflex on left and positive Hernández reflex on right. \par  \par  +b/l carpal compression testing. -Tinels b/l cubital tunnels and neg hyperflexion tests at elbows\par  \par  FORMAL REQUEST AUTHORIZATION FOR Spine surgery

## 2024-01-02 NOTE — ASSESSMENT
[FreeTextEntry1] : Given that she never had neck pain with pain radiating down the BUE prior to the described incidentl, my opinion is that the injury is causally related to her complaints. PT has been discontinued, was seeing some benefit in 2022.  PT HAS BEEN DISCONTINUED, SURGERY HAS BEEN DENIED DUE TO LACK OF RECENT PT. PT MUST BE APPROVED AT THIS POINT, AS WE ARE AT AN IMPASSE.  Carpal tunnel splints QHS. Patient has undergone extensive conservative measures including PT, meds for years with persistent upper extremity radiculopathy. Discussed continued conservative measures including PT, meds, Delmi, as well as operative interventions. Patient interested in more definitive interventions.  MRI 6/1/23 shows: C4/5 bulge with worsenign b/l NF narrowing; C5/6 bulge with HNP and b/l foraminal narrowing; C6/7 bulge with b/l foraminal narrowing; C7/T1 subtle anterolisthesis w/o clear NF stenosis  Indicating for ACDF C4/5, C5/6, C6/7. Has worsening NF stenosis C4/5 on updated MRI.  Anterior Cervical Discectomy and Fusion- We've discussed the surgery details including instrumentation and grafting options (local, allograft, ICBG, and biologics) as well as potential for complications including but not limited to pain, scar, bleeding, and infection. There is also a possibility for hardware complication such as malposition of hardware, hardware loosening, pullout, failure or fracture of bone, adjacent segment disease, pseudarthrosis, and need for future surgery. Finally, we discussed potential for injury to nerves, the spinal cord either transient or permanent, CSF leak, damage to blood vessels, paralysis, blindness, stroke, dysphagia, dysphonia, need for transfusion, and medical complications. The patient verbalized understanding and all questions were answered.  Gabapentin- Patient advised of sedating effects, instructed not to drive, operate machinery, or take with other sedating medications. Advised of need to taper on/off medication and risk of abruptly stopping gabapentin.  Patient seen by Susan Kimbrough PA-C, with and under the supervision of  Dr. Jeffrey Bell M.D.

## 2024-02-16 ENCOUNTER — APPOINTMENT (OUTPATIENT)
Dept: ORTHOPEDIC SURGERY | Facility: CLINIC | Age: 54
End: 2024-02-16
Payer: OTHER MISCELLANEOUS

## 2024-02-16 VITALS — HEIGHT: 66 IN | WEIGHT: 162 LBS | BODY MASS INDEX: 26.03 KG/M2

## 2024-02-16 PROCEDURE — 99214 OFFICE O/P EST MOD 30 MIN: CPT

## 2024-02-16 NOTE — IMAGING
[de-identified] : CSPINE Inspection: No rash or ecchymosis Palpation: No spasm in traps, rhomboids, paracervicals; TTP R trap and rhomboid ROM: Full with stiffness Strength: 5/5 bilateral deltoid, biceps, triceps, wrist flexors, wrist extensors, , abductors Sensation: Sensation present to light touch bilateral C5-T1 distributions Neurological testing for the cervical spine is as follows: positive left Spurling test, positive Hernández reflex on left and positive Hernández reflex on right.   +b/l carpal compression testing. -Tinels b/l cubital tunnels and neg hyperflexion tests at elbows  FORMAL REQUEST AUTHORIZATION FOR Spine surgery

## 2024-02-16 NOTE — HISTORY OF PRESENT ILLNESS
[Neck] : neck [Lower back] : lower back [10] : 10 [Tingling] : tingling [Constant] : constant [Not working due to injury] : Work status: not working due to injury [de-identified] :  DOI:10/31/2008;  DOI: 10/24/14 10/31/2008- Reports immediate neck pain and pain that radiated down the RUE 10/24/14- Reports return of above symptoms, after lifting at her job Cspine 4v- straight, DDD most notable C5-7, facet arthropathy  MRI C spine 1/6/22 [facility]: 1. Multilevel degenerative disc disease and spondylosis, mild to moderate in severity at C5-6 and C6-7. 2. Mild upper thoracic levoscoliosis with minimal dextroconvex cervical spinal curvature. Straightening of cervical lordosis with grade 1 anterolisthesis of C7 on T1. 3. Left eccentric disc bulge at C3-4 resulting in moderate left lateral recess and foraminal stenosis. 4. Left eccentric disc bulge at C4-5 with small broad-based right central herniation resulting in mild left foraminal stenosis. 5. Bulging disc and broad-based central herniation at C5-6 mildly impinging upon the ventral spinal cord and resulting in mild central canal and moderate to marked bilateral foraminal stenosis. 6. Bulging disc at C6-7 resulting in moderate right and moderate to marked left foraminal stenosis. 7. Pseudodisc bulge at C7-T1 with bilateral facet joint hypertrophy resulting in moderate to marked left and moderate right foraminal stenosis. 8. Broad-based central disc herniations partially visualized at T2-3 and T3-4 with minimal impingement upon the ventral spinal cord.  Ind. review-  C4/5 bulge with mild b/l NF narrowing;  C5/6 bulge with HNP and b/l foraminal narrowing;  C6/7 bulge with b/l foraminal narrowing; C7/T1 subtle anterolisthesis w/o clear NF stenosis  6/1/23 Cervical MRI  - report noted in chart.  Ind. review- C4/5 bulge with b/l NF narrowing;  C5/6 bulge with HNP and b/l foraminal narrowing;  C6/7 bulge with b/l foraminal narrowing;  EMG BUE 1/13/22- Mild L CTS  ------------------------------------- PMH: HLD; PSH: hernia, finger tendon repair, rTKA SH: intermittent tobacco, occ. etoh, denies drugs. Not working.   12/17/21- Ms. Tami Mg, a 51-year-old female, presents today for neck pain. She fell off a 12 foot ladder 10/31/2008. Pain is right in the back of the neck. Radiates down RUE with numbness into all fingers. Symptoms are worse with activity, wakes up at night with numbness. She is OOW, disabled. She was working as a  on the floor at [employer] at the time of injury.  1/10/22- MRI f/u. Still neck pain with pain radiating down the BUE through scapulae, arms, forearms; a/w headaches as well 2/23/22- continued neck pain radiating down BUE through scapulae 4/6/22- Having severe lower back pain; states her back went out when putting her pants on. PT has been helping with neck pain. Had an episode of R hand tremors with associated numbness in entire R hand and R anterior forearm a few days ago. 6/1/22- Neck and low back pain is worse since last visit. Gabapentin with mild relief. Numbness in bilateral UEs localized both dorsal and volar forearms, is somewhat worse when sleeping from elbows to all fingers bilaterally.  Physical therapy was denied.  7/13/22- Still neck pain radiating down the BUE through scapulae, arms, dorsal forearms.  2/21/23- s/p L TKA one month ago. Still neck pain radiating through the BUE to the scapulae, arms, dorsal forearms, RUE>LUE 9/6/23- still RUE>LUE radic 10/17/23- sxs same 1/2/24-sxs same. Continuing to wait for approval for surgery. 2/16/24- sxs same. Continues to wait for approval for surgery/PT. Has MICHA scheduled for 2/27/24.  Still RUE>LUE radic. Pain is very severe and is interfering with her ADLs.  [] : Post Surgical Visit: no [FreeTextEntry1] : neck and back  [FreeTextEntry5] : pt cannot lift her right arm and states she cannot sleep because of the pain when she puts pressure on the arm. [FreeTextEntry6] : numbness  [FreeTextEntry7] : into the hands

## 2024-02-16 NOTE — ASSESSMENT
[FreeTextEntry1] : Given that she never had neck pain with pain radiating down the BUE prior to the described incidentl, my opinion is that the injury is causally related to her complaints. PT has been discontinued, was seeing some benefit in 2022.  PT HAS BEEN DISCONTINUED, SURGERY HAS BEEN DENIED DUE TO LACK OF RECENT PT. PT MUST BE APPROVED AT THIS POINT, AS WE ARE AT AN IMPASSE.  Carpal tunnel splints QHS. Patient has undergone extensive conservative measures including PT, meds for years with persistent upper extremity radiculopathy. Discussed continued conservative measures including PT, meds, Delmi, as well as operative interventions. Patient interested in more definitive interventions.  MRI 6/1/23 shows: C4/5 bulge with worsenign b/l NF narrowing; C5/6 bulge with HNP and b/l foraminal narrowing; C6/7 bulge with b/l foraminal narrowing; C7/T1 subtle anterolisthesis w/o clear NF stenosis  Indicating for ACDF C4/5, C5/6, C6/7. Has worsening NF stenosis C4/5 on updated MRI.  Anterior Cervical Discectomy and Fusion- We've discussed the surgery details including instrumentation and grafting options (local, allograft, ICBG, and biologics) as well as potential for complications including but not limited to pain, scar, bleeding, and infection. There is also a possibility for hardware complication such as malposition of hardware, hardware loosening, pullout, failure or fracture of bone, adjacent segment disease, pseudarthrosis, and need for future surgery. Finally, we discussed potential for injury to nerves, the spinal cord either transient or permanent, CSF leak, damage to blood vessels, paralysis, blindness, stroke, dysphagia, dysphonia, need for transfusion, and medical complications. The patient verbalized understanding and all questions were answered.  Increase ce to 100 mg in the AM and 300 mg QHS Gabapentin- Patient advised of sedating effects, instructed not to drive, operate machinery, or take with other sedating medications. Advised of need to taper on/off medication and risk of abruptly stopping gabapentin.  Patient seen by Susan Kimbrough PA-C, with and under the supervision of  Dr. Jeffrey Bell M.D.

## 2024-03-29 ENCOUNTER — APPOINTMENT (OUTPATIENT)
Dept: ORTHOPEDIC SURGERY | Facility: CLINIC | Age: 54
End: 2024-03-29
Payer: OTHER MISCELLANEOUS

## 2024-03-29 VITALS — BODY MASS INDEX: 26.03 KG/M2 | WEIGHT: 162 LBS | HEIGHT: 66 IN

## 2024-03-29 PROCEDURE — 99214 OFFICE O/P EST MOD 30 MIN: CPT

## 2024-03-29 RX ORDER — MELOXICAM 15 MG/1
15 TABLET ORAL DAILY
Qty: 30 | Refills: 1 | Status: ACTIVE | COMMUNITY
Start: 2022-07-11 | End: 1900-01-01

## 2024-03-29 NOTE — HISTORY OF PRESENT ILLNESS
[Neck] : neck [Lower back] : lower back [10] : 10 [Tingling] : tingling [Constant] : constant [Not working due to injury] : Work status: not working due to injury [de-identified] :  DOI:10/31/2008;  DOI: 10/24/14 10/31/2008- Reports immediate neck pain and pain that radiated down the RUE 10/24/14- Reports return of above symptoms, after lifting at her job Cspine 4v- straight, DDD most notable C5-7, facet arthropathy  MRI C spine 1/6/22 [facility]: 1. Multilevel degenerative disc disease and spondylosis, mild to moderate in severity at C5-6 and C6-7. 2. Mild upper thoracic levoscoliosis with minimal dextroconvex cervical spinal curvature. Straightening of cervical lordosis with grade 1 anterolisthesis of C7 on T1. 3. Left eccentric disc bulge at C3-4 resulting in moderate left lateral recess and foraminal stenosis. 4. Left eccentric disc bulge at C4-5 with small broad-based right central herniation resulting in mild left foraminal stenosis. 5. Bulging disc and broad-based central herniation at C5-6 mildly impinging upon the ventral spinal cord and resulting in mild central canal and moderate to marked bilateral foraminal stenosis. 6. Bulging disc at C6-7 resulting in moderate right and moderate to marked left foraminal stenosis. 7. Pseudodisc bulge at C7-T1 with bilateral facet joint hypertrophy resulting in moderate to marked left and moderate right foraminal stenosis. 8. Broad-based central disc herniations partially visualized at T2-3 and T3-4 with minimal impingement upon the ventral spinal cord.  Ind. review-  C4/5 bulge with mild b/l NF narrowing;  C5/6 bulge with HNP and b/l foraminal narrowing;  C6/7 bulge with b/l foraminal narrowing; C7/T1 subtle anterolisthesis w/o clear NF stenosis  6/1/23 Cervical MRI  - report noted in chart.  Ind. review- C4/5 bulge with b/l NF narrowing;  C5/6 bulge with HNP and b/l foraminal narrowing;  C6/7 bulge with b/l foraminal narrowing;  EMG BUE 1/13/22- Mild L CTS  ------------------------------------- PMH: HLD; PSH: hernia, finger tendon repair, rTKA SH: intermittent tobacco, occ. etoh, denies drugs. Not working.   12/17/21- Ms. Tami Mg, a 51-year-old female, presents today for neck pain. She fell off a 12 foot ladder 10/31/2008. Pain is right in the back of the neck. Radiates down RUE with numbness into all fingers. Symptoms are worse with activity, wakes up at night with numbness. She is OOW, disabled. She was working as a  on the floor at [employer] at the time of injury.  1/10/22- MRI f/u. Still neck pain with pain radiating down the BUE through scapulae, arms, forearms; a/w headaches as well 2/23/22- continued neck pain radiating down BUE through scapulae 4/6/22- Having severe lower back pain; states her back went out when putting her pants on. PT has been helping with neck pain. Had an episode of R hand tremors with associated numbness in entire R hand and R anterior forearm a few days ago. 6/1/22- Neck and low back pain is worse since last visit. Gabapentin with mild relief. Numbness in bilateral UEs localized both dorsal and volar forearms, is somewhat worse when sleeping from elbows to all fingers bilaterally.  Physical therapy was denied.  7/13/22- Still neck pain radiating down the BUE through scapulae, arms, dorsal forearms.  2/21/23- s/p L TKA one month ago. Still neck pain radiating through the BUE to the scapulae, arms, dorsal forearms, RUE>LUE 9/6/23- still RUE>LUE radic 10/17/23- sxs same 1/2/24-sxs same. Continuing to wait for approval for surgery. 2/16/24- sxs same. Continues to wait for approval for surgery/PT. Has MICHA scheduled for 2/27/24.  Still RUE>LUE radic. Pain is very severe and is interfering with her ADLs.  3/29/24-sxs same. Continues to wait for approval for surgery/PT. Had MICHA.  Still RUE>LUE radic, worsening. Pain is very severe and is interfering with her ADLs. Interferes with sleep as well.  [] : This patient has had an injection before: no [FreeTextEntry1] : neck and back  [FreeTextEntry5] : pt cannot lift her right arm and states she cannot sleep because of the pain when she puts pressure on the arm. [FreeTextEntry7] : into the hands  [FreeTextEntry6] : numbness

## 2024-03-29 NOTE — IMAGING
[de-identified] : CSPINE Inspection: No rash or ecchymosis Palpation: No spasm in traps, rhomboids, paracervicals; TTP R trap and rhomboid ROM: Full with stiffness Strength: 5/5 bilateral deltoid, biceps, triceps, wrist flexors, wrist extensors, , abductors Sensation: Sensation present to light touch bilateral C5-T1 distributions Neurological testing for the cervical spine is as follows: positive left Spurling test, positive Hernández reflex on left and positive Hernández reflex on right.   +b/l carpal compression testing. -Tinels b/l cubital tunnels and neg hyperflexion tests at elbows  FORMAL REQUEST AUTHORIZATION FOR Spine surgery

## 2024-03-29 NOTE — ASSESSMENT
[FreeTextEntry1] : Given that she never had neck pain with pain radiating down the BUE prior to the described incidentl, my opinion is that the injury is causally related to her complaints. PT has been discontinued, was seeing some benefit in 2022.  PT HAS BEEN DISCONTINUED, SURGERY HAS BEEN DENIED DUE TO LACK OF RECENT PT. PT MUST BE APPROVED AT THIS POINT, AS WE ARE AT AN IMPASSE.  Carpal tunnel splints QHS. Patient has undergone extensive conservative measures including PT, meds for years with persistent upper extremity radiculopathy. Discussed continued conservative measures including PT, meds, Delmi, as well as operative interventions. Patient interested in more definitive interventions.  MRI 6/1/23 shows: C4/5 bulge with worsenign b/l NF narrowing; C5/6 bulge with HNP and b/l foraminal narrowing; C6/7 bulge with b/l foraminal narrowing; C7/T1 subtle anterolisthesis w/o clear NF stenosis  Indicating for ACDF C4/5, C5/6, C6/7. Has worsening NF stenosis C4/5 on updated MRI.  Anterior Cervical Discectomy and Fusion- We've discussed the surgery details including instrumentation and grafting options (local, allograft, ICBG, and biologics) as well as potential for complications including but not limited to pain, scar, bleeding, and infection. There is also a possibility for hardware complication such as malposition of hardware, hardware loosening, pullout, failure or fracture of bone, adjacent segment disease, pseudarthrosis, and need for future surgery. Finally, we discussed potential for injury to nerves, the spinal cord either transient or permanent, CSF leak, damage to blood vessels, paralysis, blindness, stroke, dysphagia, dysphonia, need for transfusion, and medical complications. The patient verbalized understanding and all questions were answered.  ce  100 mg in the AM and 300 mg QHS Gabapentin- Patient advised of sedating effects, instructed not to drive, operate machinery, or take with other sedating medications. Advised of need to taper on/off medication and risk of abruptly stopping gabapentin.  Patient seen by Susan Kimbrough PA-C, with and under the supervision of  Dr. Jeffrey Bell M.D.

## 2024-04-04 NOTE — ASU PREOP CHECKLIST - INTERNAL PROSTHESES
right knee hardware/yes(specify)
Simple: Patient demonstrates quick and easy understanding/Patient asked questions/Verbalized Understanding

## 2024-04-08 ENCOUNTER — NON-APPOINTMENT (OUTPATIENT)
Age: 54
End: 2024-04-08

## 2024-04-08 DIAGNOSIS — M79.674 PAIN IN RIGHT TOE(S): ICD-10-CM

## 2024-04-08 DIAGNOSIS — L60.0 INGROWING NAIL: ICD-10-CM

## 2024-05-14 ENCOUNTER — APPOINTMENT (OUTPATIENT)
Dept: ORTHOPEDIC SURGERY | Facility: CLINIC | Age: 54
End: 2024-05-14
Payer: OTHER MISCELLANEOUS

## 2024-05-14 VITALS — HEIGHT: 66 IN | WEIGHT: 162 LBS | BODY MASS INDEX: 26.03 KG/M2

## 2024-05-14 DIAGNOSIS — M54.12 RADICULOPATHY, CERVICAL REGION: ICD-10-CM

## 2024-05-14 DIAGNOSIS — M50.10 CERVICAL DISC DISORDER WITH RADICULOPATHY, UNSPECIFIED CERVICAL REGION: ICD-10-CM

## 2024-05-14 PROCEDURE — 99214 OFFICE O/P EST MOD 30 MIN: CPT

## 2024-05-14 RX ORDER — GABAPENTIN 100 MG/1
100 CAPSULE ORAL
Qty: 30 | Refills: 1 | Status: ACTIVE | COMMUNITY
Start: 2024-02-16 | End: 1900-01-01

## 2024-05-14 RX ORDER — GABAPENTIN 300 MG/1
300 CAPSULE ORAL
Qty: 30 | Refills: 0 | Status: ACTIVE | COMMUNITY
Start: 2023-02-21 | End: 1900-01-01

## 2024-05-14 NOTE — HISTORY OF PRESENT ILLNESS
[Neck] : neck [Lower back] : lower back [10] : 10 [Tingling] : tingling [Constant] : constant [Not working due to injury] : Work status: not working due to injury [de-identified] :  DOI:10/31/2008;  DOI: 10/24/14 10/31/2008- Reports immediate neck pain and pain that radiated down the RUE 10/24/14- Reports return of above symptoms, after lifting at her job Cspine 4v- straight, DDD most notable C5-7, facet arthropathy  MRI C spine 1/6/22 [facility]: 1. Multilevel degenerative disc disease and spondylosis, mild to moderate in severity at C5-6 and C6-7. 2. Mild upper thoracic levoscoliosis with minimal dextroconvex cervical spinal curvature. Straightening of cervical lordosis with grade 1 anterolisthesis of C7 on T1. 3. Left eccentric disc bulge at C3-4 resulting in moderate left lateral recess and foraminal stenosis. 4. Left eccentric disc bulge at C4-5 with small broad-based right central herniation resulting in mild left foraminal stenosis. 5. Bulging disc and broad-based central herniation at C5-6 mildly impinging upon the ventral spinal cord and resulting in mild central canal and moderate to marked bilateral foraminal stenosis. 6. Bulging disc at C6-7 resulting in moderate right and moderate to marked left foraminal stenosis. 7. Pseudodisc bulge at C7-T1 with bilateral facet joint hypertrophy resulting in moderate to marked left and moderate right foraminal stenosis. 8. Broad-based central disc herniations partially visualized at T2-3 and T3-4 with minimal impingement upon the ventral spinal cord.  Ind. review-  C4/5 bulge with mild b/l NF narrowing;  C5/6 bulge with HNP and b/l foraminal narrowing;  C6/7 bulge with b/l foraminal narrowing; C7/T1 subtle anterolisthesis w/o clear NF stenosis  6/1/23 Cervical MRI  - report noted in chart.  Ind. review- C4/5 bulge with b/l NF narrowing;  C5/6 bulge with HNP and b/l foraminal narrowing;  C6/7 bulge with b/l foraminal narrowing;  EMG BUE 1/13/22- Mild L CTS  ---------------------------------------------------------------------------------------------------------------- PMH: HLD; PSH: hernia, finger tendon repair, rTKA SH: intermittent tobacco, occ. etoh, denies drugs. Not working.   12/17/21- Ms. Tami Mg, a 51-year-old female, presents today for neck pain. She fell off a 12 foot ladder 10/31/2008. Pain is right in the back of the neck. Radiates down RUE with numbness into all fingers. Symptoms are worse with activity, wakes up at night with numbness. She is OOW, disabled. She was working as a  on the floor at [employer] at the time of injury.  1/10/22- MRI f/u. Still neck pain with pain radiating down the BUE through scapulae, arms, forearms; a/w headaches as well 2/23/22- continued neck pain radiating down BUE through scapulae 4/6/22- Having severe lower back pain; states her back went out when putting her pants on. PT has been helping with neck pain. Had an episode of R hand tremors with associated numbness in entire R hand and R anterior forearm a few days ago. 6/1/22- Neck and low back pain is worse since last visit. Gabapentin with mild relief. Numbness in bilateral UEs localized both dorsal and volar forearms, is somewhat worse when sleeping from elbows to all fingers bilaterally.  Physical therapy was denied.  7/13/22- Still neck pain radiating down the BUE through scapulae, arms, dorsal forearms.  2/21/23- s/p L TKA one month ago. Still neck pain radiating through the BUE to the scapulae, arms, dorsal forearms, RUE>LUE 9/6/23- still RUE>LUE radic 10/17/23- sxs same 1/2/24-sxs same. Continuing to wait for approval for surgery. 2/16/24- sxs same. Continues to wait for approval for surgery/PT. Has MICHA scheduled for 2/27/24.  Still RUE>LUE radic. Pain is very severe and is interfering with her ADLs.  3/29/24-sxs same. Continues to wait for approval for surgery/PT. Had MICHA.  Still RUE>LUE radic, worsening. Pain is very severe and is interfering with her ADLs. Interferes with sleep as well.  5/14/24- Reports PT was partially approved. Still RUE>LUE radic with N/T.  [] : Post Surgical Visit: no [FreeTextEntry1] : neck and back  [FreeTextEntry6] : numbness  [FreeTextEntry7] : into the hands

## 2024-05-14 NOTE — IMAGING
[de-identified] : CSPINE Inspection: No rash or ecchymosis Palpation: No spasm in traps, rhomboids, paracervicals; TTP R trap and rhomboid ROM: Full with stiffness Strength: 5/5 bilateral deltoid, biceps, triceps, wrist flexors, wrist extensors, , abductors Sensation: Sensation present to light touch bilateral C5-T1 distributions Neurological testing for the cervical spine is as follows: positive left Spurling test, positive Hernández reflex on left and positive Hernández reflex on right.   +b/l carpal compression testing. -Tinels b/l cubital tunnels and neg hyperflexion tests at elbows

## 2024-05-14 NOTE — ASSESSMENT
[FreeTextEntry1] : Given that she never had neck pain with pain radiating down the BUE prior to the described incidentl, my opinion is that the injury is causally related to her complaints. PT has been discontinued, was seeing some benefit in 2022.  PT now has been partially approved. Will monitor progress and continue surgical discussion based on progress.   Carpal tunnel splints QHS. MRI 6/1/23 shows: C4/5 bulge with worsenign b/l NF narrowing; C5/6 bulge with HNP and b/l foraminal narrowing; C6/7 bulge with b/l foraminal narrowing; C7/T1 subtle anterolisthesis w/o clear NF stenosis  Have discussed ACDF C4/5, C5/6, C6/7. Has worsening NF stenosis C4/5 on updated MRI. Anterior Cervical Discectomy and Fusion- We've discussed the surgery details including instrumentation and grafting options (local, allograft, ICBG, and biologics) as well as potential for complications including but not limited to pain, scar, bleeding, and infection. There is also a possibility for hardware complication such as malposition of hardware, hardware loosening, pullout, failure or fracture of bone, adjacent segment disease, pseudarthrosis, and need for future surgery. Finally, we discussed potential for injury to nerves, the spinal cord either transient or permanent, CSF leak, damage to blood vessels, paralysis, blindness, stroke, dysphagia, dysphonia, need for transfusion, and medical complications. The patient verbalized understanding and all questions were answered.  ce  100 mg in the AM and 300 mg QHS Gabapentin- Patient advised of sedating effects, instructed not to drive, operate machinery, or take with other sedating medications. Advised of need to taper on/off medication and risk of abruptly stopping gabapentin.

## 2024-07-09 ENCOUNTER — APPOINTMENT (OUTPATIENT)
Dept: ORTHOPEDIC SURGERY | Facility: CLINIC | Age: 54
End: 2024-07-09
Payer: OTHER MISCELLANEOUS

## 2024-07-09 VITALS — BODY MASS INDEX: 26.03 KG/M2 | HEIGHT: 66 IN | WEIGHT: 162 LBS

## 2024-07-09 DIAGNOSIS — M50.10 CERVICAL DISC DISORDER WITH RADICULOPATHY, UNSPECIFIED CERVICAL REGION: ICD-10-CM

## 2024-07-09 PROCEDURE — 99214 OFFICE O/P EST MOD 30 MIN: CPT

## 2024-07-09 RX ORDER — TRAZODONE HYDROCHLORIDE 150 MG/1
150 TABLET ORAL
Qty: 30 | Refills: 0 | Status: ACTIVE | COMMUNITY
Start: 2024-07-09

## 2024-07-09 RX ORDER — TIZANIDINE HYDROCHLORIDE 4 MG/1
4 CAPSULE ORAL
Qty: 30 | Refills: 2 | Status: ACTIVE | COMMUNITY
Start: 2024-07-09 | End: 1900-01-01

## 2024-07-30 ENCOUNTER — APPOINTMENT (OUTPATIENT)
Dept: ORTHOPEDIC SURGERY | Facility: CLINIC | Age: 54
End: 2024-07-30
Payer: OTHER MISCELLANEOUS

## 2024-07-30 VITALS — WEIGHT: 162 LBS | HEIGHT: 66 IN | BODY MASS INDEX: 26.03 KG/M2

## 2024-07-30 DIAGNOSIS — Z96.651 PAIN DUE TO INTERNAL ORTHOPEDIC PROSTHETIC DEVICES, IMPLANTS AND GRAFTS, SUBSEQUENT ENCOUNTER: ICD-10-CM

## 2024-07-30 DIAGNOSIS — Z96.652 PRESENCE OF LEFT ARTIFICIAL KNEE JOINT: ICD-10-CM

## 2024-07-30 DIAGNOSIS — T84.84XD PAIN DUE TO INTERNAL ORTHOPEDIC PROSTHETIC DEVICES, IMPLANTS AND GRAFTS, SUBSEQUENT ENCOUNTER: ICD-10-CM

## 2024-07-30 PROCEDURE — 99214 OFFICE O/P EST MOD 30 MIN: CPT

## 2024-07-30 PROCEDURE — 73562 X-RAY EXAM OF KNEE 3: CPT | Mod: 50

## 2024-07-30 NOTE — HISTORY OF PRESENT ILLNESS
[6] : 6 [de-identified] : 7/30/24: f/up bilateral knees. She is 1.5 years s/p L TKA with me, and 8 years s/p R TKA with Dr. Pabon. She started having some pain in the left knee ~1 month ago after prolonged activity, but overall doing well. She has continued pain in the right knee.   Previous doc: ([facility] DOI 10/31/2008) Pt seeing Dr Pabon for years for her génesis knee injury at work - she is on disability now- she had a Rt TKA in 2016 - she is having pain with this has cont swelling sn has difficulty bending her knee - pain standing and kneeling- always has swelling in the knee- her left knee has pain with OA has had inj and cont to have pain - new rt knee clicking as well - was never sig improved after surgery -- stairs are very difficulty =she feels overall the rt knee is worse right 4/16/19: F/U MRI left knee. Had blood work done. Both knees still hurt. 6/10/19: No change - cont pain in both knees. No auth yet for HA inj left knee or brace right knee. 11/24/20: Progressively worsening pain of both knees. Previously prescribed mobic did not give her relief.  12/18/20: F/U MRI both knees. Cortisone inj left knee helped for 3 days. 1/26/21: Orthovisc #1 left knee. 2/2/21: Orthovisc #2 left knee. 2/9/21: Orthovisc #3 left knee. 2/22/21: orthovisc #4 left knee. 11/23/21: f/up for bilateral knees. she was seen by Dr. Ryan on 10/11/21 as she was having increasing pain. She was given a CSI for the L knee which was beneficial for 2 days. She is complaining of increasing pain and loss of ROM in the left knee. She is also having medial right knee pain. She has not yet been approved for PT. 12/13/21: F/u review CT results. 1/21/22: continued and worsening pain in the left knee. She was recently told she needs spinal surgery and would like to continue with PT for now. 4/11/22: Continued and worsening pain in b/l knees. Has not been able to attend PT due to authorization issues. Prev inj provided mild relief.  6/13/22: continued and worsening pain in b/l knees- currently, the right is worse than the left. She had delayed PT course due to auth issues 7/11/22: No change, since last visit.  Has not had any PT auth yet.  States there were no records received by  at her last hearing. 8/22/22: No change since last visit - just got auth for PT and is scheduled to start this week. 9/26/22: Issue with auth for left TKA surgery last month was cancelled.  Worsening pain, currently in PT. 10/10/22: Some relief from PT with ROM and strnth but continues to have pain daily - has to use a cane for ambuluation  11/14/22: Continued and worsening pain in b/l knees. She is scheduled for surgery in the new year. Having trouble with ADL's. Has been doing HEP. 1/31/23: 2 weeks s/p L TKA - She is overall doing well with some soreness. No fevers/chills. She has been making good progress with at home PT.  2/27/23: 6 weeks s/p L TKA - She is overall doing well with pain about 7-8/10. She has been making good progress with PT. She has some pain in the left shin/ankle 3/27/23: 10 weeks postop, min pain.  PT helps. 6/26/23: Min pain, stopped PT.  Doing HEP. [] : no [FreeTextEntry5] : pain returned

## 2024-07-30 NOTE — DISCUSSION/SUMMARY
[de-identified] : The patient was advised of the diagnosis.  The natural history of the pathology was explained in full to the patient in layman's terms. All questions were answered.  The risks and benefits of surgical and non-surgical treatment alternatives were explained in full to the patient.  Progress Note completed by EDWINA Garcia * Dr. Mendoza -- The documentation recorded in this note accurately reflects the decisions made by me during this visit.  I interviewed and examined the patient personally and oversaw all medical decisions.

## 2024-07-30 NOTE — ASSESSMENT
[FreeTextEntry1] : Previous doc: Rt Total knee- painful ? mild mid flex instability otherwise looks good - we will get blood work and try a brace to see if that helps - her left knee only has mild OA so I do not see benefit for TKA at this point - for now we will focus on rt as that is the more painful knee 4/16/19: MRI showing lateral and PF OA, no meniscus tear seen. Blood work neg. She is not a candidate for arthroscopy and not enough damage yet for left TKA. Long time discussion regarding outcomes of revision for right knee but for now will just try orthovisc left knee. 6/10/19: Auth pending for HA inj left knee and HKB right knee. For now will try PT and Mobic. 11/24/20: Continued pain the right knee at the proximal aspect of patella and quad insertion, feels that she has small defect there. Will get MRI of the right knee to look at quad tendon and patella component. Left knee has moderate OA that has progressively gotten worse. Will try injection today. Will also repeat MRI of the left knee to look for progression of her previous injury and to evaluate fibular head lesion to confirm no change. 12/18/20: Short term relief from left knee inj - MRI with sig PF OA, recc HA injections. Right TKA with small defect medial retinaculum - unsure if surgery would help at this point so will first recc PT for quad strength. 1/26/21: Inj tolerated well. 2/2/21: Inj tolerated well. 2/9/21: Inj tolerated well. 2/22/21: Inj tolerated well. Start PT for both knees. 11/23/21: R TKA hardware in place; she continues to have R knee pain. Increased left knee pain and loss of ROM. Discussed pain medication vs. L TKA. She has done CSI and HA injections. Most recent CSI 10/11/21 provided minimal relief. Advised her pain may be idiopathic. Patient would like to proceed with L TKA. 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 Planning to proceed with L TKA. Will send to oncologist to confirm nothing needs to be done for fibular head at time of TKA. waiting for Auth for TKA 1/21/22: Evaluation of the lesion is begin as per the oncologist. She is planning for TKA after her family issues resolve. She also is being treated for her cspine. Treated with CSI today to alleviate symptoms in the mean time 4/11/22: overall right TKA still has significant pain and complex postop course due to lack of PT. Will continue to monitor this.  She has significant OA left knee and is preparing for TKA once she feels she is able to and will continue PT on b/l knees. - Discussed not a candidate for Rev TKA at this time with unknow cause of pain   6/13/22: b/l knee still continued pain, cont PT for both knee building strength plan for L TKA benji assisted waiting for auth- I am conc right knee still gives her pain w/o obbvi reason. Continue to work this up for underlying cause. CT scan showed bone cyst was told it was benign.  7/11/22: No change - still with pain in both knees.  Again recc PT to work on strengthening and reeval after 4-6 weeks of treatment. 8/22/22: Starting PT this week for both knees and will then reeval.  Still painful right TKA if no improvement will get further workup while we await auth for left TKA. 9/26/22: Left knee inj today for acute relief, cont PT and reeval in 6 weeks.  I still feel we should proceed with TKA but will try some more conservative treatments for now while we wait for auth. 10/10/22: Continues to have pain and will likely need surgery in the new year. Will continue PT for now and FU in 4 weeks.  11/14/22: She is planning for L TKA in the new year. She continues to have pain in the right knee but will re-eval this pain after L TKA and discuss options. At this point, I see no obvious signs for pain in her right TKA at this time and discussed this may be her body's reaction to knee replacements.She understands this and is aware she may feel similar with her left knee but she feels she cannot delay surgery as she cont to loose funcition in the knee. Risks and benefits again discussed and all questions answered. CT reviewed: 2 degrees valgus femur and 3 degrees varus tibia.  1/31/23: 2 weeks s/p L TKA - She is overall doing well with some soreness, minimal swelling. ROM 3-95 today, advised to work on extension. She will start PT/HEP and follow up in 4 weeks.  2/27/23: 6 weeks s/p L TKA - She is doing well with full ROM and excellent function. She will continue with PT and begin to transition to HEP. Follow up in 4 weeks. She will also start PT on the right knee as well. she hs 8/10 pain but she has great function and is very happy  3/27/23: 10 weeks postop doing very well, cont PT/HEP, abx for dentist, return at 1 year postop. 6/26/23: Doing very well, cont PT.  HEP to work on quad strength, reeval in 3 months.  7/30/24: Will obtain bloodwork to r/o infection. Will also obtain CT of the right knee to eval prosthesis. Briefly discussed synovectomy and liner exchange. f/up after testing, may consider a dx/tx CSI

## 2024-07-30 NOTE — IMAGING
[Bilateral] : knee bilaterally [No loss of surgical correlation. Bony alignment acceptable. Hardware in appropriate position] : No loss of surgical correlation. Bony alignment acceptable. Hardware in appropriate position [Components well fixed, in good position] : Components well fixed, in good position

## 2024-07-30 NOTE — PHYSICAL EXAM
[de-identified] : Left knee: Inc healed.  No effusion.  ROM 0-120.  Lig stable.  NVI.  Walks without assistance.

## 2024-08-28 ENCOUNTER — APPOINTMENT (OUTPATIENT)
Dept: ORTHOPEDIC SURGERY | Facility: CLINIC | Age: 54
End: 2024-08-28
Payer: OTHER MISCELLANEOUS

## 2024-08-28 DIAGNOSIS — M50.10 CERVICAL DISC DISORDER WITH RADICULOPATHY, UNSPECIFIED CERVICAL REGION: ICD-10-CM

## 2024-08-28 DIAGNOSIS — S39.012A STRAIN OF MUSCLE, FASCIA AND TENDON OF LOWER BACK, INITIAL ENCOUNTER: ICD-10-CM

## 2024-08-28 PROCEDURE — 72110 X-RAY EXAM L-2 SPINE 4/>VWS: CPT

## 2024-08-28 PROCEDURE — 20552 NJX 1/MLT TRIGGER POINT 1/2: CPT

## 2024-08-28 PROCEDURE — 99214 OFFICE O/P EST MOD 30 MIN: CPT | Mod: 25

## 2024-08-28 NOTE — HISTORY OF PRESENT ILLNESS
[Neck] : neck [Lower back] : lower back [10] : 10 [Tingling] : tingling [Constant] : constant [Not working due to injury] : Work status: not working due to injury [de-identified] :  DOI:10/31/2008;  DOI: 10/24/14 10/31/2008- Reports immediate neck pain and pain that radiated down the RUE 10/24/14- Reports return of above symptoms, after lifting at her job Cspine 4v- straight, DDD most notable C5-7, facet arthropathy  MRI C spine 1/6/22 [facility]: 1. Multilevel degenerative disc disease and spondylosis, mild to moderate in severity at C5-6 and C6-7. 2. Mild upper thoracic levoscoliosis with minimal dextroconvex cervical spinal curvature. Straightening of cervical lordosis with grade 1 anterolisthesis of C7 on T1. 3. Left eccentric disc bulge at C3-4 resulting in moderate left lateral recess and foraminal stenosis. 4. Left eccentric disc bulge at C4-5 with small broad-based right central herniation resulting in mild left foraminal stenosis. 5. Bulging disc and broad-based central herniation at C5-6 mildly impinging upon the ventral spinal cord and resulting in mild central canal and moderate to marked bilateral foraminal stenosis. 6. Bulging disc at C6-7 resulting in moderate right and moderate to marked left foraminal stenosis. 7. Pseudodisc bulge at C7-T1 with bilateral facet joint hypertrophy resulting in moderate to marked left and moderate right foraminal stenosis. 8. Broad-based central disc herniations partially visualized at T2-3 and T3-4 with minimal impingement upon the ventral spinal cord.  Ind. review-  C4/5 bulge with mild b/l NF narrowing;  C5/6 bulge with HNP and b/l foraminal narrowing;  C6/7 bulge with b/l foraminal narrowing; C7/T1 subtle anterolisthesis w/o clear NF stenosis  6/1/23 Cervical MRI  - report noted in chart.  Ind. review- C4/5 bulge with b/l NF narrowing;  C5/6 bulge with HNP and b/l foraminal narrowing;  C6/7 bulge with b/l foraminal narrowing;  EMG BUE 1/13/22- Mild L CTS  ---------------------------------------------------------------------------------------------------------------- PMH: HLD; PSH: hernia, finger tendon repair, rTKA SH: intermittent tobacco, occ. etoh, denies drugs. Not working.   12/17/21- Ms. Tami Mg, a 51-year-old female, presents today for neck pain. She fell off a 12 foot ladder 10/31/2008. Pain is right in the back of the neck. Radiates down RUE with numbness into all fingers. Symptoms are worse with activity, wakes up at night with numbness. She is OOW, disabled. She was working as a  on the floor at [employer] at the time of injury.  1/10/22- MRI f/u. Still neck pain with pain radiating down the BUE through scapulae, arms, forearms; a/w headaches as well 2/23/22- continued neck pain radiating down BUE through scapulae 4/6/22- Having severe lower back pain; states her back went out when putting her pants on. PT has been helping with neck pain. Had an episode of R hand tremors with associated numbness in entire R hand and R anterior forearm a few days ago. 6/1/22- Neck and low back pain is worse since last visit. Gabapentin with mild relief. Numbness in bilateral UEs localized both dorsal and volar forearms, is somewhat worse when sleeping from elbows to all fingers bilaterally.  Physical therapy was denied.  7/13/22- Still neck pain radiating down the BUE through scapulae, arms, dorsal forearms.  2/21/23- s/p L TKA one month ago. Still neck pain radiating through the BUE to the scapulae, arms, dorsal forearms, RUE>LUE 9/6/23- still RUE>LUE radic 10/17/23- sxs same 1/2/24-sxs same. Continuing to wait for approval for surgery. 2/16/24- sxs same. Continues to wait for approval for surgery/PT. Has [facility] scheduled for 2/27/24.  Still RUE>LUE radic. Pain is very severe and is interfering with her ADLs.  3/29/24-sxs same. Continues to wait for approval for surgery/PT. Had [facility].  Still RUE>LUE radic, worsening. Pain is very severe and is interfering with her ADLs. Interferes with sleep as well.  5/14/24- Reports PT was partially approved. Still RUE>LUE radic with N/T.  7/9/24- Continued severe neck pain with radiation down RUE>LUE. Pain interferes with sleep. Has been approved for PT and is starting next week.  8/28/24- Worsening neck and lower back pain. Complains of n/t in bilateral hands. Worsening right sided lower back pain after she went from sitting to standing position.  [] : Post Surgical Visit: no [FreeTextEntry1] : neck and back  [FreeTextEntry6] : numbness  [FreeTextEntry7] : into the hands

## 2024-08-28 NOTE — ASSESSMENT
[FreeTextEntry1] : Given that she never had neck pain with pain radiating down the BUE prior to the described incidentl, my opinion is that the injury is causally related to her complaints. PT has been discontinued, was seeing some benefit in 2022.  - Strongly recommend PT for cervical and lumbar spine   Carpal tunnel splints QHS. MRI 6/1/23 shows: C4/5 bulge with worsenign b/l NF narrowing; C5/6 bulge with HNP and b/l foraminal narrowing; C6/7 bulge with b/l foraminal narrowing; C7/T1 subtle anterolisthesis w/o clear NF stenosis  Have discussed ACDF C4/5, C5/6, C6/7. Has worsening NF stenosis C4/5 on updated MRI. Anterior Cervical Discectomy and Fusion- We've discussed the surgery details including instrumentation and grafting options (local, allograft, ICBG, and biologics) as well as potential for complications including but not limited to pain, scar, bleeding, and infection. There is also a possibility for hardware complication such as malposition of hardware, hardware loosening, pullout, failure or fracture of bone, adjacent segment disease, pseudarthrosis, and need for future surgery. Finally, we discussed potential for injury to nerves, the spinal cord either transient or permanent, CSF leak, damage to blood vessels, paralysis, blindness, stroke, dysphagia, dysphonia, need for transfusion, and medical complications. The patient verbalized understanding and all questions were answered.  ce  100 mg in the AM and 300 mg QHS Gabapentin- Patient advised of sedating effects, instructed not to drive, operate machinery, or take with other sedating medications. Advised of need to taper on/off medication and risk of abruptly stopping gabapentin.  Muscle Relaxants- To help decrease muscle spasm and assist with pain relief. Advised of sedating effects and instructed not to drive, operate heavy machinery, or take with other sedating medications.  NSAIDs- Patient warned of risk of medication to GI tract, increased blood pressure, cardiac risk, and risk of fluid retention.  Advised to clear medication with internist or PCP if any concurrent health problem with heart, blood pressure, or GI system exists.  Trigger Point Injection administered to right lumbar paraspinal region- The risks, benefits, contents and alternatives to injection were explained in full to the patient.  Risks outlined include but are not limited to infection, bleeding, scarring, skin discoloration, temporary increase in pain, syncopal episode, failure to resolve symptoms, allergic reaction, flare reaction, permanent white skin discoloration, symptom recurrence, and elevation of blood sugar in diabetics.  Patient understood the risks.  All questions were answered.  After discussion of options, patient requested an injection.  Oral informed consent was obtained and sterile prep was done of the injection site.  Sterile technique was used to introduce the mixture. The mixture consisted of:  4 cc 1% lidocaine 80 mg of depomedrol Patient tolerated the procedure well.  Patient advised to ice the injection site this evening.  Signs and symptoms of infection reviewed and patient advised to call immediately for redness, fevers, and/or chills.

## 2024-08-28 NOTE — IMAGING
[Facet arthropathy] : Facet arthropathy [Disc space narrowing] : Disc space narrowing [Spondylolithesis] : Spondylolithesis [de-identified] : CSPINE Inspection: No rash or ecchymosis Palpation: No spasm in traps, rhomboids, paracervicals; TTP R trap and rhomboid ROM: Full with stiffness Strength: 5/5 bilateral deltoid, biceps, triceps, wrist flexors, wrist extensors, , abductors Sensation: Sensation present to light touch bilateral C5-T1 distributions Neurological testing for the cervical spine is as follows: positive left Spurling test, positive Hernández reflex on left and positive Hernández reflex on right.   +b/l carpal compression testing. -Tinels b/l cubital tunnels and neg hyperflexion tests at elbows  Back: - No pain with palpation of spinous processes or paraspinal musculature - No pain with SI palpation - Pain with palpation of right lumbar paraspinal muscles - ROM limited throughout flexion, extension, sidebending, and rotation

## 2024-10-09 ENCOUNTER — APPOINTMENT (OUTPATIENT)
Dept: ORTHOPEDIC SURGERY | Facility: CLINIC | Age: 54
End: 2024-10-09
Payer: OTHER MISCELLANEOUS

## 2024-10-09 DIAGNOSIS — S39.012A STRAIN OF MUSCLE, FASCIA AND TENDON OF LOWER BACK, INITIAL ENCOUNTER: ICD-10-CM

## 2024-10-09 DIAGNOSIS — M50.10 CERVICAL DISC DISORDER WITH RADICULOPATHY, UNSPECIFIED CERVICAL REGION: ICD-10-CM

## 2024-10-09 PROCEDURE — 99214 OFFICE O/P EST MOD 30 MIN: CPT

## 2024-12-06 ENCOUNTER — APPOINTMENT (OUTPATIENT)
Dept: ORTHOPEDIC SURGERY | Facility: CLINIC | Age: 54
End: 2024-12-06
Payer: OTHER MISCELLANEOUS

## 2024-12-06 DIAGNOSIS — M50.10 CERVICAL DISC DISORDER WITH RADICULOPATHY, UNSPECIFIED CERVICAL REGION: ICD-10-CM

## 2024-12-06 PROCEDURE — 99214 OFFICE O/P EST MOD 30 MIN: CPT

## 2024-12-23 ENCOUNTER — APPOINTMENT (OUTPATIENT)
Dept: ORTHOPEDIC SURGERY | Facility: CLINIC | Age: 54
End: 2024-12-23
Payer: OTHER MISCELLANEOUS

## 2024-12-23 VITALS — BODY MASS INDEX: 26.03 KG/M2 | WEIGHT: 162 LBS | HEIGHT: 66 IN

## 2024-12-23 DIAGNOSIS — T84.84XD PAIN DUE TO INTERNAL ORTHOPEDIC PROSTHETIC DEVICES, IMPLANTS AND GRAFTS, SUBSEQUENT ENCOUNTER: ICD-10-CM

## 2024-12-23 DIAGNOSIS — Z96.652 PRESENCE OF LEFT ARTIFICIAL KNEE JOINT: ICD-10-CM

## 2024-12-23 DIAGNOSIS — Z96.651 PRESENCE OF RIGHT ARTIFICIAL KNEE JOINT: ICD-10-CM

## 2024-12-23 DIAGNOSIS — Z96.651 PAIN DUE TO INTERNAL ORTHOPEDIC PROSTHETIC DEVICES, IMPLANTS AND GRAFTS, SUBSEQUENT ENCOUNTER: ICD-10-CM

## 2024-12-23 PROCEDURE — 99214 OFFICE O/P EST MOD 30 MIN: CPT

## 2025-01-03 ENCOUNTER — APPOINTMENT (OUTPATIENT)
Dept: ORTHOPEDIC SURGERY | Facility: CLINIC | Age: 55
End: 2025-01-03

## 2025-01-16 ENCOUNTER — NON-APPOINTMENT (OUTPATIENT)
Age: 55
End: 2025-01-16

## 2025-01-24 ENCOUNTER — APPOINTMENT (OUTPATIENT)
Dept: ORTHOPEDIC SURGERY | Facility: CLINIC | Age: 55
End: 2025-01-24
Payer: OTHER MISCELLANEOUS

## 2025-01-24 DIAGNOSIS — M50.10 CERVICAL DISC DISORDER WITH RADICULOPATHY, UNSPECIFIED CERVICAL REGION: ICD-10-CM

## 2025-01-24 PROCEDURE — 99214 OFFICE O/P EST MOD 30 MIN: CPT

## 2025-02-11 ENCOUNTER — APPOINTMENT (OUTPATIENT)
Dept: MRI IMAGING | Facility: CLINIC | Age: 55
End: 2025-02-11
Payer: OTHER MISCELLANEOUS

## 2025-02-11 PROCEDURE — 72141 MRI NECK SPINE W/O DYE: CPT

## 2025-03-07 ENCOUNTER — APPOINTMENT (OUTPATIENT)
Dept: ORTHOPEDIC SURGERY | Facility: CLINIC | Age: 55
End: 2025-03-07

## 2025-03-07 DIAGNOSIS — M50.10 CERVICAL DISC DISORDER WITH RADICULOPATHY, UNSPECIFIED CERVICAL REGION: ICD-10-CM

## 2025-03-07 DIAGNOSIS — M54.16 RADICULOPATHY, LUMBAR REGION: ICD-10-CM

## 2025-03-07 PROCEDURE — 99215 OFFICE O/P EST HI 40 MIN: CPT | Mod: 25

## 2025-03-07 PROCEDURE — 20552 NJX 1/MLT TRIGGER POINT 1/2: CPT

## 2025-04-03 ENCOUNTER — APPOINTMENT (OUTPATIENT)
Dept: ORTHOPEDIC SURGERY | Facility: HOSPITAL | Age: 55
End: 2025-04-03

## 2025-04-18 ENCOUNTER — APPOINTMENT (OUTPATIENT)
Dept: ORTHOPEDIC SURGERY | Facility: CLINIC | Age: 55
End: 2025-04-18

## 2025-04-25 ENCOUNTER — APPOINTMENT (OUTPATIENT)
Dept: ORTHOPEDIC SURGERY | Facility: CLINIC | Age: 55
End: 2025-04-25
Payer: OTHER MISCELLANEOUS

## 2025-04-25 DIAGNOSIS — M54.12 RADICULOPATHY, CERVICAL REGION: ICD-10-CM

## 2025-04-25 DIAGNOSIS — M54.16 RADICULOPATHY, LUMBAR REGION: ICD-10-CM

## 2025-04-25 PROCEDURE — 99215 OFFICE O/P EST HI 40 MIN: CPT

## 2025-04-29 ENCOUNTER — APPOINTMENT (OUTPATIENT)
Dept: ORTHOPEDIC SURGERY | Facility: CLINIC | Age: 55
End: 2025-04-29

## 2025-04-29 ENCOUNTER — APPOINTMENT (OUTPATIENT)
Dept: ORTHOPEDIC SURGERY | Facility: CLINIC | Age: 55
End: 2025-04-29
Payer: OTHER MISCELLANEOUS

## 2025-04-29 VITALS — HEIGHT: 66 IN | BODY MASS INDEX: 29.73 KG/M2 | WEIGHT: 185 LBS

## 2025-04-29 VITALS — BODY MASS INDEX: 26.52 KG/M2 | WEIGHT: 165 LBS | HEIGHT: 66 IN

## 2025-04-29 DIAGNOSIS — Z96.651 PRESENCE OF RIGHT ARTIFICIAL KNEE JOINT: ICD-10-CM

## 2025-04-29 DIAGNOSIS — Z96.651 PAIN DUE TO INTERNAL ORTHOPEDIC PROSTHETIC DEVICES, IMPLANTS AND GRAFTS, SUBSEQUENT ENCOUNTER: ICD-10-CM

## 2025-04-29 DIAGNOSIS — T84.84XD PAIN DUE TO INTERNAL ORTHOPEDIC PROSTHETIC DEVICES, IMPLANTS AND GRAFTS, SUBSEQUENT ENCOUNTER: ICD-10-CM

## 2025-04-29 PROCEDURE — 99214 OFFICE O/P EST MOD 30 MIN: CPT | Mod: ACP

## 2025-04-29 RX ORDER — TRAMADOL HYDROCHLORIDE 50 MG/1
50 TABLET, COATED ORAL
Qty: 21 | Refills: 0 | Status: ACTIVE | COMMUNITY
Start: 2025-04-29 | End: 1900-01-01

## 2025-05-09 ENCOUNTER — APPOINTMENT (OUTPATIENT)
Dept: ORTHOPEDIC SURGERY | Facility: CLINIC | Age: 55
End: 2025-05-09
Payer: OTHER MISCELLANEOUS

## 2025-05-09 DIAGNOSIS — M54.12 RADICULOPATHY, CERVICAL REGION: ICD-10-CM

## 2025-05-09 PROCEDURE — 99214 OFFICE O/P EST MOD 30 MIN: CPT | Mod: 25

## 2025-05-09 PROCEDURE — 20552 NJX 1/MLT TRIGGER POINT 1/2: CPT

## 2025-06-13 ENCOUNTER — APPOINTMENT (OUTPATIENT)
Dept: ORTHOPEDIC SURGERY | Facility: CLINIC | Age: 55
End: 2025-06-13
Payer: OTHER MISCELLANEOUS

## 2025-06-13 PROCEDURE — 20552 NJX 1/MLT TRIGGER POINT 1/2: CPT

## 2025-06-13 PROCEDURE — 99215 OFFICE O/P EST HI 40 MIN: CPT

## 2025-06-23 ENCOUNTER — APPOINTMENT (OUTPATIENT)
Dept: ORTHOPEDIC SURGERY | Facility: CLINIC | Age: 55
End: 2025-06-23
Payer: OTHER MISCELLANEOUS

## 2025-06-23 VITALS — WEIGHT: 165 LBS | BODY MASS INDEX: 26.52 KG/M2 | HEIGHT: 66 IN

## 2025-06-23 PROCEDURE — 99214 OFFICE O/P EST MOD 30 MIN: CPT

## 2025-06-27 ENCOUNTER — APPOINTMENT (OUTPATIENT)
Dept: ORTHOPEDIC SURGERY | Facility: CLINIC | Age: 55
End: 2025-06-27
Payer: OTHER MISCELLANEOUS

## 2025-06-27 PROCEDURE — 99214 OFFICE O/P EST MOD 30 MIN: CPT

## 2025-08-01 ENCOUNTER — APPOINTMENT (OUTPATIENT)
Dept: ORTHOPEDIC SURGERY | Facility: CLINIC | Age: 55
End: 2025-08-01

## 2025-08-01 DIAGNOSIS — M54.12 RADICULOPATHY, CERVICAL REGION: ICD-10-CM

## 2025-08-01 PROCEDURE — 99215 OFFICE O/P EST HI 40 MIN: CPT

## 2025-08-01 PROCEDURE — 20552 NJX 1/MLT TRIGGER POINT 1/2: CPT

## 2025-08-15 ENCOUNTER — APPOINTMENT (OUTPATIENT)
Dept: ORTHOPEDIC SURGERY | Facility: CLINIC | Age: 55
End: 2025-08-15

## 2025-09-19 ENCOUNTER — APPOINTMENT (OUTPATIENT)
Dept: ORTHOPEDIC SURGERY | Facility: CLINIC | Age: 55
End: 2025-09-19
Payer: OTHER MISCELLANEOUS

## 2025-09-19 DIAGNOSIS — M54.12 RADICULOPATHY, CERVICAL REGION: ICD-10-CM

## 2025-09-19 PROCEDURE — 99213 OFFICE O/P EST LOW 20 MIN: CPT

## (undated) DEVICE — MIXER BONE CEMENT EVAC III

## (undated) DEVICE — HOOD T5 PEELAWAY

## (undated) DEVICE — MAKO BLADE STANDARD

## (undated) DEVICE — MAKO VIZADISC KNEE TRACKING KIT

## (undated) DEVICE — SUCTION TIP KAMVAC MINI

## (undated) DEVICE — SUT STRATAFIX SPIRAL PDS PLUS 2-0 45CM CT-1

## (undated) DEVICE — NDL HYPO SAFE 22G X 1.5" (BLACK)

## (undated) DEVICE — TOURNIQUET ESMARK 6"

## (undated) DEVICE — SUT STRATAFIX SPIRAL MONOCRYL PLUS 4-0 45CM PS-2 UNDYED

## (undated) DEVICE — DRSG DERMABOND PRINEO 22CM

## (undated) DEVICE — MAKO CHECKPOINT KIT FEMORAL / TIBIAL

## (undated) DEVICE — ZIMMER PULSAVAC PLUS FAN KIT

## (undated) DEVICE — FRA-ESU BOVIE FORCE TRIAD T7J19717DX: Type: DURABLE MEDICAL EQUIPMENT

## (undated) DEVICE — SAW BLADE STRYKER SAGITTAL 25X0.89X75MM

## (undated) DEVICE — PREP SCRUB BRUSH W CHG 4%

## (undated) DEVICE — DRAPE SURGICAL #1010

## (undated) DEVICE — MAKO DRAPE KIT

## (undated) DEVICE — SUT STRATAFIX SYMMETRIC PDS PLUS 1 18" CTX VIOLET

## (undated) DEVICE — CRYO/CUFF GRAVITY COOLER KNEE LARGE

## (undated) DEVICE — MEDICATION LABELS W MARKER

## (undated) DEVICE — SYR LUER LOK 20CC

## (undated) DEVICE — DRSG ACE BANDAGE 6"

## (undated) DEVICE — SOL IRR BAG NS 0.9% 3000ML

## (undated) DEVICE — PACK TOTAL KNEE

## (undated) DEVICE — VENODYNE/SCD SLEEVE CALF MEDIUM

## (undated) DEVICE — DRSG TELFA 3 X 8

## (undated) DEVICE — DRAPE STERI-DRAPE INCISE 19X17"

## (undated) DEVICE — SUT VICRYL 0 27" CT-1 UNDYED

## (undated) DEVICE — WARMING BLANKET UPPER ADULT

## (undated) DEVICE — GLV 8.5 PROTEXIS (WHITE)